# Patient Record
Sex: FEMALE | Race: WHITE | Employment: FULL TIME | URBAN - NONMETROPOLITAN AREA
[De-identification: names, ages, dates, MRNs, and addresses within clinical notes are randomized per-mention and may not be internally consistent; named-entity substitution may affect disease eponyms.]

---

## 2020-09-16 ENCOUNTER — HOSPITAL ENCOUNTER (OUTPATIENT)
Dept: NEUROLOGY | Age: 30
Discharge: HOME | End: 2020-09-16
Payer: COMMERCIAL

## 2020-09-16 PROCEDURE — 95886 MUSC TEST DONE W/N TEST COMP: CPT | Performed by: PSYCHIATRY & NEUROLOGY

## 2020-09-16 PROCEDURE — 95909 NRV CNDJ TST 5-6 STUDIES: CPT | Performed by: PSYCHIATRY & NEUROLOGY

## 2020-09-16 PROCEDURE — 95886 MUSC TEST DONE W/N TEST COMP: CPT

## 2020-09-16 PROCEDURE — 95909 NRV CNDJ TST 5-6 STUDIES: CPT

## 2023-11-01 ENCOUNTER — INITIAL PRENATAL (OUTPATIENT)
Dept: OBSTETRICS AND GYNECOLOGY | Facility: CLINIC | Age: 33
End: 2023-11-01
Payer: COMMERCIAL

## 2023-11-01 VITALS
WEIGHT: 189 LBS | SYSTOLIC BLOOD PRESSURE: 106 MMHG | HEIGHT: 65 IN | BODY MASS INDEX: 31.49 KG/M2 | DIASTOLIC BLOOD PRESSURE: 70 MMHG

## 2023-11-01 DIAGNOSIS — O99.340 DEPRESSION AFFECTING PREGNANCY, ANTEPARTUM: ICD-10-CM

## 2023-11-01 DIAGNOSIS — Z71.85 IMMUNIZATION COUNSELING: ICD-10-CM

## 2023-11-01 DIAGNOSIS — F32.A DEPRESSION AFFECTING PREGNANCY, ANTEPARTUM: ICD-10-CM

## 2023-11-01 DIAGNOSIS — O21.0 MORNING SICKNESS: ICD-10-CM

## 2023-11-01 DIAGNOSIS — Z36.3 SCREENING, ANTENATAL, FOR MALFORMATION BY ULTRASOUND: ICD-10-CM

## 2023-11-01 DIAGNOSIS — O36.80X0 ENCOUNTER TO DETERMINE FETAL VIABILITY OF PREGNANCY, SINGLE OR UNSPECIFIED FETUS: Primary | ICD-10-CM

## 2023-11-01 DIAGNOSIS — Z3A.09 9 WEEKS GESTATION OF PREGNANCY: ICD-10-CM

## 2023-11-01 DIAGNOSIS — Z34.81 MULTIGRAVIDA IN FIRST TRIMESTER: ICD-10-CM

## 2023-11-01 PROBLEM — Z34.90 PREGNANCY: Status: ACTIVE | Noted: 2023-11-01

## 2023-11-01 PROCEDURE — 0501F PRENATAL FLOW SHEET: CPT | Performed by: ADVANCED PRACTICE MIDWIFE

## 2023-11-01 NOTE — PROGRESS NOTES
33-year old patient arrived to initiate prenatal care.     HPI: 33-year old . Patient's last menstrual period was 2023 (approximate). This was not a planned pregnancy, but it was not prevented. She is happy. Pre-pregnancy weight of 182 pounds.    Previous prenatal history significant for: TSVD x 1  History significant for: obesity, former smoker, depression, Bactrim allergy    The following portion of the patient's history were reviewed and updated as needed: allergies, current medications, past family history, past medical history, social history, surgical history, and problem list.    ROS: All systems reviewed and are negative with exception of the following: mood changes (increased depression), amenorrhea, breast tenderness, fatigue, nausea, vomiting      US ordered today, reviewed and shows IUP of 9w6d gestation and Estimated Date of Delivery: 24  Last Pap Smear: patient cannot recall and deferred today with patient request.    Exam:  Wt: 189 lb for TWG of 3.175 kg (7 lb), B/P 106/70, FHTs 162   General Appearance:  healthy-appearing .  HEENT:  NCAT, EOMI, neck supple, no thyroidmegaly.  HR str and reg. No murmur. Lungs clear. Resp even and unlabored.  Abd: Soft, nontender. Bowel sounds active. Uterus is consistent with EGA.  Ext: NT, nontender. No cyanosis or edema.    Diagnoses and all orders for this visit:    1. Encounter to determine fetal viability of pregnancy, single or unspecified fetus (Primary)  - Ultrasound today and reviewed: Viable butts intrauterine gestation. Crown-rump length measures 29.3 mm for estimated gestational age of 9 weeks, 5 days. This is within 6 days of the gestational age by approximate LMP.     2. 9 weeks gestation of pregnancy  - Discussed and ordered initial prenatal labs today:  -     ToxASSURE Select 13 (MW) - Urine, Clean Catch  -     ABO / Rh  -     Antibody Screen  -     CBC & Differential  -     Chlamydia trachomatis, Neisseria gonorrhoeae,  Trichomonas vaginalis, PCR - Urine, Urine, Random Void  -     Hepatitis B Surface Antigen  -     HIV-1 / O / 2 Ag / Antibody  -     RPR, Rfx Qn RPR / Confirm TP  -     Rubella Antibody, IgG  -     Urine Culture - Urine, Urine, Random Void  -     Varicella Zoster Antibody, IgG  -     HCV Antibody Rfx To Qnt PCR  -     Ambulatory Referral to M/Perinatology    3. Multigravida in first trimester  - Reviewed information in new OB packet, including OTC medications for use during pregnancy, first timester of pregnancy and discomforts, regular OB routine, ffDNA and maternal carrier screening testing.  First Trimester of Pregnancy video included in AVS.  - Advised to maintain regular activity.  - Reviewed and encouraged pt to report vaginal bleeding, pelvic pain or cramping, any prolonged illness or infection, or any other concerns.  - RTO in 4 weeks with Dr. Naqvi and as needed with concerns    4. Depression affecting pregnancy, antepartum  - Discussed with patient measures of support, including healthy support system, non-pharmacologic measures, counseling, and medication. Discussed some mental health meads may be taken in pregnancy after weight risk-benefit. Patient to notify provider if symptoms persist or worsen.     5. Morning sickness  - Discussed nonpharmacologic nausea relief measures, including small frequent meals, dry crackers upon rising, carbonated beverages, food/drink containing ginger (ginger ale, tea, gum), avoidance of triggers such as smells, accupressure wrist bands, rest, pregnancy pops, sour candies, aromatherapy. Morning Sickness education included in the AVS.  - Encouraged adequate hydration. Discussed signs of dehydration.  - Counseled on OTC pharmacologic tx with Vitamin B6 and Doxylamine. OTC instructions and Doxylamine; Vitamin B6 Delayed- and Extended-Release Tablets education included in the AVS.  - Call if symptoms fail to improve or worsen.    6. Screening, , for malformation by  ultrasound  - Discussed plan of referral to perinatology at approximately 20-weeks gestation for anatomy scan. Anatomy scan to assess for possible anomalies or markers for aneuploidy.   -     Ambulatory Referral to MFM/Perinatology    7. Immunization counseling  - Discussed influenza vaccine recommendations during pregnancy. Patient declines to receive the influenza immunization today in the clinic. Influenza (Flu) Vaccine (Inactivated or Recombinant): What You Need to Know (VIS) education included in the AVS.        This note has been signed electronically.     Ela Burton, DNP, APRN, CNM, RNC-OB

## 2023-11-05 LAB
ABO GROUP BLD: NORMAL
BACTERIA UR CULT: NORMAL
BACTERIA UR CULT: NORMAL
BASOPHILS # BLD AUTO: 0.04 10*3/MM3 (ref 0–0.2)
BASOPHILS NFR BLD AUTO: 0.4 % (ref 0–1.5)
BLD GP AB SCN SERPL QL: NEGATIVE
C TRACH RRNA SPEC QL NAA+PROBE: NEGATIVE
EOSINOPHIL # BLD AUTO: 0.18 10*3/MM3 (ref 0–0.4)
EOSINOPHIL NFR BLD AUTO: 1.8 % (ref 0.3–6.2)
ERYTHROCYTE [DISTWIDTH] IN BLOOD BY AUTOMATED COUNT: 13.7 % (ref 12.3–15.4)
HBV SURFACE AG SERPL QL IA: NEGATIVE
HCT VFR BLD AUTO: 40.9 % (ref 34–46.6)
HCV AB SERPL QL IA: NORMAL
HCV IGG SERPL QL IA: NON REACTIVE
HGB BLD-MCNC: 13.4 G/DL (ref 12–15.9)
HIV 1+2 AB+HIV1 P24 AG SERPL QL IA: NON REACTIVE
IMM GRANULOCYTES # BLD AUTO: 0.07 10*3/MM3 (ref 0–0.05)
IMM GRANULOCYTES NFR BLD AUTO: 0.7 % (ref 0–0.5)
LYMPHOCYTES # BLD AUTO: 2.72 10*3/MM3 (ref 0.7–3.1)
LYMPHOCYTES NFR BLD AUTO: 28 % (ref 19.6–45.3)
MCH RBC QN AUTO: 27.2 PG (ref 26.6–33)
MCHC RBC AUTO-ENTMCNC: 32.8 G/DL (ref 31.5–35.7)
MCV RBC AUTO: 83.1 FL (ref 79–97)
MONOCYTES # BLD AUTO: 0.58 10*3/MM3 (ref 0.1–0.9)
MONOCYTES NFR BLD AUTO: 6 % (ref 5–12)
N GONORRHOEA RRNA SPEC QL NAA+PROBE: NEGATIVE
NEUTROPHILS # BLD AUTO: 6.14 10*3/MM3 (ref 1.7–7)
NEUTROPHILS NFR BLD AUTO: 63.1 % (ref 42.7–76)
NRBC BLD AUTO-RTO: 0 /100 WBC (ref 0–0.2)
PLATELET # BLD AUTO: 427 10*3/MM3 (ref 140–450)
RBC # BLD AUTO: 4.92 10*6/MM3 (ref 3.77–5.28)
RH BLD: NEGATIVE
RPR SER QL: NON REACTIVE
RUBV IGG SERPL IA-ACNC: 25.4 INDEX
T VAGINALIS RRNA SPEC QL NAA+PROBE: POSITIVE
VZV IGG SER IA-ACNC: 2632 INDEX
WBC # BLD AUTO: 9.73 10*3/MM3 (ref 3.4–10.8)

## 2023-11-06 DIAGNOSIS — O98.311: Primary | ICD-10-CM

## 2023-11-06 LAB — DRUGS UR: NORMAL

## 2023-11-06 RX ORDER — METRONIDAZOLE 500 MG/1
500 TABLET ORAL 2 TIMES DAILY
Qty: 14 TABLET | Refills: 0 | Status: SHIPPED | OUTPATIENT
Start: 2023-11-06 | End: 2023-11-13

## 2023-11-06 NOTE — TELEPHONE ENCOUNTER
Pt called back to discuss results of her prenatal labs. Pt's urine was positive for Trichomonas and Flagyl has not yet been sent to her pharmacy. Med pending if appropriate

## 2023-11-13 PROBLEM — Z64.1 MULTIGRAVIDA: Status: ACTIVE | Noted: 2023-11-13

## 2023-11-29 ENCOUNTER — ROUTINE PRENATAL (OUTPATIENT)
Dept: OBSTETRICS AND GYNECOLOGY | Facility: CLINIC | Age: 33
End: 2023-11-29
Payer: COMMERCIAL

## 2023-11-29 VITALS — SYSTOLIC BLOOD PRESSURE: 116 MMHG | WEIGHT: 190 LBS | BODY MASS INDEX: 31.62 KG/M2 | DIASTOLIC BLOOD PRESSURE: 78 MMHG

## 2023-11-29 DIAGNOSIS — Z34.81 SUPERVISION OF NORMAL INTRAUTERINE PREGNANCY IN MULTIGRAVIDA, FIRST TRIMESTER: Primary | ICD-10-CM

## 2023-11-29 NOTE — PROGRESS NOTES
No complaints. Keeping down po. Declines NIPS/carrier screening. Denies pain, VB, LOF.  Reports small episodes of FM.     /78   Wt 86.2 kg (190 lb)   LMP 08/30/2023 (Approximate)   BMI 31.62 kg/m²    FHTs 143  Abdomen: soft/NT/ND normal BS     Diagnoses and all orders for this visit:    1. Supervision of normal intrauterine pregnancy in multigravida, first trimester (Primary)  IUP at 13 6/7 weeks gestation for return OB visit. Pt doing well. Declines NIPS/carrier screening. Declines flu shot. Sneak peak US for gender next visit. RTC 4 weeks.

## 2023-12-27 ENCOUNTER — ROUTINE PRENATAL (OUTPATIENT)
Dept: OBSTETRICS AND GYNECOLOGY | Facility: CLINIC | Age: 33
End: 2023-12-27
Payer: COMMERCIAL

## 2023-12-27 VITALS — SYSTOLIC BLOOD PRESSURE: 116 MMHG | DIASTOLIC BLOOD PRESSURE: 78 MMHG | BODY MASS INDEX: 30.45 KG/M2 | WEIGHT: 183 LBS

## 2023-12-27 DIAGNOSIS — Z3A.17 17 WEEKS GESTATION OF PREGNANCY: Primary | ICD-10-CM

## 2023-12-27 DIAGNOSIS — Z28.21 INFLUENZA VACCINATION DECLINED: ICD-10-CM

## 2023-12-27 DIAGNOSIS — Z34.82 MULTIGRAVIDA IN SECOND TRIMESTER: ICD-10-CM

## 2023-12-27 LAB
GLUCOSE UR STRIP-MCNC: NEGATIVE MG/DL
PROT UR STRIP-MCNC: NEGATIVE MG/DL

## 2023-12-27 PROCEDURE — 0502F SUBSEQUENT PRENATAL CARE: CPT | Performed by: ADVANCED PRACTICE MIDWIFE

## 2023-12-27 RX ORDER — PRENATAL VIT NO.126/IRON/FOLIC 28MG-0.8MG
TABLET ORAL DAILY
COMMUNITY

## 2023-12-27 NOTE — PROGRESS NOTES
Reason for visit: Routine OB visit at 17w6d. KIM 2024, by Ultrasound    CC:  She is feeling okay today. She does report she has experienced some pains by the belly button and wants to make sure it is not concerning. Denies VB, LOF, pelvic pain, or cramping.     ROS: All systems reviewed and are negative with exception of the following: amenorrhea    Wt 183 lb for a TWG of 0.454 kg (1 lb), /78, FHTs 152  Urine today and reviewed: negative glucose, negative protein    Anatomy Scan: scheduled for 2024 at 1230    Exam:  General Appearance:  Healthy appearing . Normal mood and behavior.  HEENT: NCAT, EOMI  HR str and reg. Lungs clear. Resp even and unlabored.  Abdomen is soft and nontender. No CVA tenderness. Uterus is consistent with EGA.  Ext: no edema, nontender, no trauma, or cyanosis.    Impression  Diagnoses and all orders for this visit:    1. 17 weeks gestation of pregnancy (Primary)  -     POC Urinalysis Dipstick    2. Multigravida in second trimester  Discussed second trimester of pregnancy, discomforts and measures of support, fetal movement, pelvic pain warnings, bleeding warnings, and signs and symptoms to report. Discussed and encouraged to call or come to the hospital with vaginal bleeding, leaking of fluid, pelvic pain, or other concerns. Second Trimester of Pregnancy video and Round Ligament Pain education included in the AVS.    3. Influenza vaccination declined  Reviewed influenza immunization recommendations during pregnancy. Discussed immune health and self-care measures. Encouraged screening and testing if she would experience influenza-like illness symptoms for possible treatment with Tamiflu if indicated.           Return to the office in 4 weeks for a routine prenatal visit with Dr. Calabrese as patient would like to meet additional providers and as needed with concerns.        This note has been signed electronically.    Ela Burton, DNP, APRN, CNM, RNC-OB

## 2024-01-30 ENCOUNTER — ROUTINE PRENATAL (OUTPATIENT)
Dept: OBSTETRICS AND GYNECOLOGY | Age: 34
End: 2024-01-30
Payer: COMMERCIAL

## 2024-01-30 VITALS — SYSTOLIC BLOOD PRESSURE: 118 MMHG | BODY MASS INDEX: 30.95 KG/M2 | WEIGHT: 186 LBS | DIASTOLIC BLOOD PRESSURE: 82 MMHG

## 2024-01-30 DIAGNOSIS — Z3A.22 22 WEEKS GESTATION OF PREGNANCY: Primary | ICD-10-CM

## 2024-01-30 LAB
GLUCOSE UR STRIP-MCNC: NEGATIVE MG/DL
PROT UR STRIP-MCNC: ABNORMAL MG/DL

## 2024-01-30 PROCEDURE — 0502F SUBSEQUENT PRENATAL CARE: CPT | Performed by: OBSTETRICS & GYNECOLOGY

## 2024-01-30 NOTE — PROGRESS NOTES
Tired, but no other complaints.  No LOF or VB  Good FM  MFM u/s with IEF in L ventricle, will look at it again with growth u/s at 32 weeks  Patient reports she and partner were both treated for trichomonas  Weight gain appropriate  GCT at next visit.  Instructions given

## 2024-02-21 ENCOUNTER — ROUTINE PRENATAL (OUTPATIENT)
Dept: OBSTETRICS AND GYNECOLOGY | Age: 34
End: 2024-02-21
Payer: COMMERCIAL

## 2024-02-21 VITALS — SYSTOLIC BLOOD PRESSURE: 114 MMHG | WEIGHT: 189 LBS | DIASTOLIC BLOOD PRESSURE: 76 MMHG | BODY MASS INDEX: 31.45 KG/M2

## 2024-02-21 DIAGNOSIS — A59.01 TRICHOMONAL VAGINITIS DURING PREGNANCY, ANTEPARTUM: ICD-10-CM

## 2024-02-21 DIAGNOSIS — Z30.8 ENCOUNTER FOR TUBAL LIGATION COUNSELING: ICD-10-CM

## 2024-02-21 DIAGNOSIS — Z34.82 MULTIGRAVIDA IN SECOND TRIMESTER: ICD-10-CM

## 2024-02-21 DIAGNOSIS — O23.599 TRICHOMONAL VAGINITIS DURING PREGNANCY, ANTEPARTUM: ICD-10-CM

## 2024-02-21 DIAGNOSIS — Z3A.25 25 WEEKS GESTATION OF PREGNANCY: Primary | ICD-10-CM

## 2024-02-21 DIAGNOSIS — Z28.21 INFLUENZA VACCINATION DECLINED: ICD-10-CM

## 2024-02-21 DIAGNOSIS — Z71.85 IMMUNIZATION COUNSELING: ICD-10-CM

## 2024-02-21 PROBLEM — O35.BXX0 FETAL CARDIAC ECHOGENIC FOCUS, ANTEPARTUM: Status: ACTIVE | Noted: 2024-01-09

## 2024-02-21 LAB
GLUCOSE UR STRIP-MCNC: NEGATIVE MG/DL
PROT UR STRIP-MCNC: ABNORMAL MG/DL

## 2024-02-21 NOTE — PROGRESS NOTES
Reason for visit: Routine OB visit at 25w6d     CC:  Endorses good fetal movement. Denies VB, LOF, contractions, h/a, visual changes, and right upper quadrant pain.     ROS: All systems reviewed and are negative.    Wt 189 lb for a TWG of 3.175 kg (7 lb), /76, FHTs 141, FH 26 cm  Urine today and reviewed: negative glucose, trace protein    19-week Anatomy scan: EFW 50%; EIF; anterior placenta without evidence of placenta previa    Exam:  General Appearance:  No visualized signs of distress. Normal mood and behavior  Cardiorespiratory: HR str and reg. Lungs clear. Resp even and unlabored.  Abdomen: is soft and nontender. No CVA tenderness. Uterus is   Ext: No edema. Calves non-tender.     Impression  Diagnoses and all orders for this visit:    1. 25 weeks gestation of pregnancy (Primary)  -     POC Urinalysis Dipstick  -     Chlamydia trachomatis, Neisseria gonorrhoeae, Trichomonas vaginalis, PCR - Urine, Urine, Random Void    2. Multigravida in second trimester  Discussed second trimester of pregnancy, discomforts and measures of support, fetal movement,  labor warnings, preeclampsia warnings, and signs and symptoms to report. Also discussed with patient plan for hemoglobin and glucose tolerance testing. Instructions on glucose tolerance test and dietary intake prior to the test provided. Patient to notify provider or come to the hospital with vaginal bleeding, leaking of fluid, contractions, or other concerns. Second Trimester of Pregnancy video and Round Ligament Pain education included in the AVS.    3. Trichomonal vaginitis during pregnancy, antepartum  Previous positive test. Will send urine for test of cure.   -     Chlamydia trachomatis, Neisseria gonorrhoeae, Trichomonas vaginalis, PCR - Urine, Urine, Random Void    4. Encounter for tubal ligation counseling  Discussed the procedure as being a salpingectomy and importance with taking time with decision making as this is deemed a non-reversible  method of contraception. Discussed risks (infection, pain, bleeding, damage to organs or tissues), benefits (minimized risk of incidental/ectopic pregnancy, reduced risks of cancers like fallopian tube or ovarian), completed as either an outpatient procedure after a vaginal delivery or possibly during a non-emergent  delivery. Questions answered to patient satisfaction. Consents to be signed.     5. Immunization counseling  Reviewed Tdap immunization recommendations during pregnancy. Patient to consider receiving the Tdap immunization during her next prenatal visit. Tdap (Tetanus, Diptheria, Pertussis) Vaccine: What You Need to Know (VIS) education included in the AVS.    6. Influenza vaccination declined  Patient declines to receive the influenza immunization this prenatal visit. Reviewed immune health practices and self-care measures to promote well-being. Encouraged screening and testing if patient experiences influenza-like illness symptoms for treatment with Tamiflu if indicated.           Return to the office in 3 weeks for routine prenatal visit with Dr Calabrese with GTT and as needed with concerns.        This note has been signed electronically.    Ela Burton, DNP, APRN, CNM, RNC-OB

## 2024-02-24 LAB
C TRACH RRNA SPEC QL NAA+PROBE: NEGATIVE
N GONORRHOEA RRNA SPEC QL NAA+PROBE: NEGATIVE
T VAGINALIS RRNA SPEC QL NAA+PROBE: NEGATIVE

## 2024-03-13 ENCOUNTER — ROUTINE PRENATAL (OUTPATIENT)
Dept: OBSTETRICS AND GYNECOLOGY | Age: 34
End: 2024-03-13
Payer: COMMERCIAL

## 2024-03-13 VITALS — SYSTOLIC BLOOD PRESSURE: 110 MMHG | DIASTOLIC BLOOD PRESSURE: 78 MMHG | WEIGHT: 188 LBS | BODY MASS INDEX: 31.28 KG/M2

## 2024-03-13 DIAGNOSIS — Z11.3 ROUTINE SCREENING FOR STI (SEXUALLY TRANSMITTED INFECTION): ICD-10-CM

## 2024-03-13 DIAGNOSIS — Z13.1 SPECIAL SCREENING EXAMINATION FOR DIABETES MELLITUS: ICD-10-CM

## 2024-03-13 DIAGNOSIS — Z67.91 RH NEGATIVE, ANTEPARTUM: ICD-10-CM

## 2024-03-13 DIAGNOSIS — Z71.89 ENCOUNTER FOR ANTEPARTUM CONSULTATION REGARDING LACTATION: ICD-10-CM

## 2024-03-13 DIAGNOSIS — Z71.85 IMMUNIZATION COUNSELING: ICD-10-CM

## 2024-03-13 DIAGNOSIS — Z34.83 MULTIGRAVIDA IN THIRD TRIMESTER: ICD-10-CM

## 2024-03-13 DIAGNOSIS — Z3A.28 28 WEEKS GESTATION OF PREGNANCY: Primary | ICD-10-CM

## 2024-03-13 DIAGNOSIS — Z13.0 SCREENING FOR DEFICIENCY ANEMIA: ICD-10-CM

## 2024-03-13 DIAGNOSIS — O26.899 RH NEGATIVE, ANTEPARTUM: ICD-10-CM

## 2024-03-13 LAB
GLUCOSE UR STRIP-MCNC: NEGATIVE MG/DL
PROT UR STRIP-MCNC: ABNORMAL MG/DL

## 2024-03-13 RX ORDER — BREAST PUMP
1 EACH MISCELLANEOUS AS NEEDED
Qty: 1 EACH | Refills: 0 | Status: SHIPPED | OUTPATIENT
Start: 2024-03-13

## 2024-03-13 NOTE — PROGRESS NOTES
"Reason for visit: Routine OB visit at 28w6d     CC:  She did not complete her glucose tolerance test today. As delivery approaches or the thought of delivery, patient becomes more anxious secondary to \"horrific\" birth experience with her other child. Endorses good fetal movement. Denies VB, LOF, contractions, h/a, visual changes, and right upper quadrant pain.     ROS: All systems reviewed and are negative with exception of the following: anxious, increased stress    Wt 188 lb for a TWG of 2.722 kg (6 lb), /78, FHTs 146, FH 28 cm  Urine today and reviewed: negative glucose, 1+ protein    19-week Anatomy scan: EFW 50%; EIF; anterior placenta without evidence of placenta previa     Exam:  General Appearance:  No visualized signs of distress. Normal mood and behavior  Cardiorespiratory: HR str and reg. Lungs clear. Resp even and unlabored.  Abdomen: is soft and nontender. No CVA tenderness. Uterus is consistent with estimated gestational age.  Ext: No edema. Calves non-tender.     Impression  Diagnoses and all orders for this visit:    1. 28 weeks gestation of pregnancy (Primary)  Discussed practicing measures of relaxation for during the birthing experience. Discussed measures for mood, including anxiety.   -     POC Urinalysis Dipstick  -     Rhogam Immune Globulin Immunization  -     Antibody Screen  -     Hemoglobin  -     RPR, Rfx Qn RPR / Confirm TP  -     Gestational Screen 1 Hr ([facility])  -     Misc. Devices (Breast Pump) misc; Use 1 Device As Needed (for breastfeeding).  Dispense: 1 each; Refill: 0    2. Multigravida in third trimester  Discussed third trimester of pregnancy, including discomforts and measures of support,  labor warnings, preeclampsia warnings, and signs and symptoms to report. Discussed glucose and hemoglobin screenings today in the clinic. Labs ordered today. Patient to notify provider and/or come to the hospital for vaginal bleeding, leaking of fluid, contractions, or other " concerns. Third Trimester of Pregnancy video included in the AVS.    3. Rh negative, antepartum  Discussed the Rhogam injection due to patient's Rh negative status and patient to receive the injection after the antibody screen has been drawn. Rho [D] Immune Globulin Injection education included in the AVS.  -     Rhogam Immune Globulin Immunization  -     Antibody Screen    4. Screening for deficiency anemia  -     Hemoglobin    5. Routine screening for STI (sexually transmitted infection)  -     RPR, Rfx Qn RPR / Confirm TP    6. Immunization counseling  Reviewed Tdap immunization recommendations during pregnancy. She will consider the Tdap immunization for an upcoming prenatal visit. Tdap (Tetanus, Diptheria, Pertussis) Vaccine: What You Need to Know (VIS) education included in the AVS.    7. Special screening examination for diabetes mellitus  Reviewed diet recommendations with screening and patient will return one afternoon to complete her glucose tolerance test.   -     Gestational Screen 1 Hr ([facility])    8. Encounter for antepartum consultation regarding lactation  Discussed breastfeeding benefits, lactation support and education through the hospital's Maternal/Child Department's community education program, [employer] lactation support group, and breast pumps. Encouraged watching the breastfeeding videos listed on a handout received in the initial prenatal information. Breast pump order sent to pharmacy.  -     Misc. Devices (Breast Pump) misc; Use 1 Device As Needed (for breastfeeding).  Dispense: 1 each; Refill: 0          Return to the office in 2 weeks for routine prenatal visit with Dr. Calabrese and as needed with concerns.        This note has been signed electronically.    Ela Burton, DNP, APRN, CNM, RNC-OB

## 2024-03-14 LAB
BLD GP AB SCN SERPL QL: NEGATIVE
HGB BLD-MCNC: 10.4 G/DL (ref 11.1–15.9)
RPR SER QL: NON REACTIVE

## 2024-03-20 DIAGNOSIS — O99.019 MATERNAL ANEMIA IN PREGNANCY, ANTEPARTUM: Primary | ICD-10-CM

## 2024-03-20 RX ORDER — FERROUS SULFATE 324(65)MG
324 TABLET, DELAYED RELEASE (ENTERIC COATED) ORAL EVERY OTHER DAY
Qty: 30 TABLET | Refills: 3 | Status: SHIPPED | OUTPATIENT
Start: 2024-03-20

## 2024-03-26 ENCOUNTER — ROUTINE PRENATAL (OUTPATIENT)
Dept: OBSTETRICS AND GYNECOLOGY | Age: 34
End: 2024-03-26
Payer: COMMERCIAL

## 2024-03-26 VITALS — WEIGHT: 187 LBS | DIASTOLIC BLOOD PRESSURE: 80 MMHG | BODY MASS INDEX: 31.12 KG/M2 | SYSTOLIC BLOOD PRESSURE: 108 MMHG

## 2024-03-26 DIAGNOSIS — Z3A.30 30 WEEKS GESTATION OF PREGNANCY: Primary | ICD-10-CM

## 2024-03-26 DIAGNOSIS — G44.89 OTHER HEADACHE SYNDROME: ICD-10-CM

## 2024-03-26 LAB
CLARITY, POC: ABNORMAL
COLOR UR: ABNORMAL
GLUCOSE UR STRIP-MCNC: NEGATIVE MG/DL
PROT UR STRIP-MCNC: NEGATIVE MG/DL

## 2024-03-26 PROCEDURE — 0502F SUBSEQUENT PRENATAL CARE: CPT | Performed by: NURSE PRACTITIONER

## 2024-03-26 RX ORDER — AZITHROMYCIN 250 MG/1
TABLET, FILM COATED ORAL
COMMUNITY
Start: 2024-03-22

## 2024-03-26 NOTE — PROGRESS NOTES
"Reason for visit: Routine OB visit at 30w5d   Chief Complaint   Patient presents with    Routine Prenatal Visit     Pt reports having the 'worst headache I've ever had' and has been admitted to [facility] 3/19/24-3/20/24.  PCP put on antibiotics 3/23/24, reports left eye swelling and pressure. PCP put on Azithromycin.  Reports headache is still there but more manageable.  Couldn't come up with diagnosis so PCP treated with antibiotics.  Pt reports appropriate fetal movement and denies pelvic pain, vaginal bleeding or discharge.       CC:  headache. Endorses normal fetal movement. Denies VB, LOF, contractions, h/a, visual changes, and right upper quadrant pain. Left sided ear pain, watering of left eye and severe headache    ROS: All systems reviewed and are negative with exception of the following: \"worse headache of her lift\"    Wt 188 lb for a TWG of 2.268 kg (5 lb), /80, FHTs 139  Urine today and reviewed: neg glucose, neg protein        Exam:  General Appearance:  No visualized signs of distress. Normal mood and behavior  Cardiorespiratory: HR str and reg. Lungs clear. Resp even and unlabored.  Abdomen: is soft and nontender. no CVA tenderness. Uterus is normal for gestational age  Ext: no edema. Calves non-tender.     Impression  Diagnoses and all orders for this visit:    1. 30 weeks gestation of pregnancy (Primary)  -     POC Urinalysis Dipstick    2. Other headache syndrome  Comments:  discussed using benadryl or zyrtec/claritin   tylenol PRN        Head CT or MRI if headache not improving       Return to the office in 2 weeks for routine prenatal and growth US and as needed with concerns.    This note has been signed electronically.    Patricia RICH  "

## 2024-04-04 ENCOUNTER — ROUTINE PRENATAL (OUTPATIENT)
Age: 34
End: 2024-04-04
Payer: COMMERCIAL

## 2024-04-04 VITALS — DIASTOLIC BLOOD PRESSURE: 64 MMHG | SYSTOLIC BLOOD PRESSURE: 112 MMHG | WEIGHT: 187 LBS | BODY MASS INDEX: 31.12 KG/M2

## 2024-04-04 DIAGNOSIS — K21.9 GASTROESOPHAGEAL REFLUX DURING PREGNANCY IN THIRD TRIMESTER, ANTEPARTUM: ICD-10-CM

## 2024-04-04 DIAGNOSIS — Z67.91 RH NEGATIVE, ANTEPARTUM: ICD-10-CM

## 2024-04-04 DIAGNOSIS — O26.899 RH NEGATIVE, ANTEPARTUM: ICD-10-CM

## 2024-04-04 DIAGNOSIS — Z3A.32 32 WEEKS GESTATION OF PREGNANCY: Primary | ICD-10-CM

## 2024-04-04 DIAGNOSIS — O99.019 MATERNAL ANEMIA IN PREGNANCY, ANTEPARTUM: ICD-10-CM

## 2024-04-04 DIAGNOSIS — Z23 NEED FOR TDAP VACCINATION: ICD-10-CM

## 2024-04-04 DIAGNOSIS — O99.613 GASTROESOPHAGEAL REFLUX DURING PREGNANCY IN THIRD TRIMESTER, ANTEPARTUM: ICD-10-CM

## 2024-04-04 DIAGNOSIS — Z34.83 MULTIGRAVIDA IN THIRD TRIMESTER: ICD-10-CM

## 2024-04-04 LAB
GLUCOSE UR STRIP-MCNC: NEGATIVE MG/DL
PROT UR STRIP-MCNC: NEGATIVE MG/DL

## 2024-04-04 PROCEDURE — 0502F SUBSEQUENT PRENATAL CARE: CPT | Performed by: ADVANCED PRACTICE MIDWIFE

## 2024-04-04 PROCEDURE — 90715 TDAP VACCINE 7 YRS/> IM: CPT | Performed by: ADVANCED PRACTICE MIDWIFE

## 2024-04-04 PROCEDURE — 90471 IMMUNIZATION ADMIN: CPT | Performed by: ADVANCED PRACTICE MIDWIFE

## 2024-04-04 RX ORDER — FAMOTIDINE 20 MG/1
20 TABLET, FILM COATED ORAL DAILY
Qty: 90 TABLET | Refills: 1 | Status: SHIPPED | OUTPATIENT
Start: 2024-04-04 | End: 2025-04-04

## 2024-04-04 NOTE — PROGRESS NOTES
Reason for visit: Routine OB visit at 32w0d     CC:  She is overall feeling well today. Endorses good fetal movement. Denies VB, LOF, contractions, h/a, visual changes, and right upper quadrant pain.     ROS: All systems reviewed and are negative with exception of the following: amenorrhea, reflux/heartburn    Wt 84.8 kg (187 lb) lb for a TWG of 2.268 kg (5 lb), /64, FHTs 144, FH 31 cm  Urine today and reviewed: negative glucose, negative protein      32-week U/S today: EFW 56.4%, 1994 g; AC 61%; APRIL 19.91 cm, DVP 6.10 cm; normal interval growth; vertex presentation; anterior placenta      Exam:  General Appearance:  No visualized signs of distress. Normal mood and behavior  Cardiorespiratory: HR str and reg. Lungs clear. Resp even and unlabored.  Abdomen: is soft and nontender. No CVA tenderness. Uterus is consistent with estimated gestational age..  Ext: No edema. Calves non-tender.     Impression  Diagnoses and all orders for this visit:    1. 32 weeks gestation of pregnancy (Primary)  Planning for  as pediatrician in the hospital and then LAYLA Rondon as the provider in the office.   -     POC Urinalysis Dipstick  -     famotidine (Pepcid) 20 MG tablet; Take 1 tablet by mouth Daily.  Dispense: 90 tablet; Refill: 1  -     Tdap Vaccine => 6yo IM (BOOSTRIX)    2. Multigravida in third trimester  Discussed third trimester of pregnancy, discomforts and measures of support, fetal movement counts,  labor warnings, preeclampsia warnings, and signs and symptoms to report. Discussed and encouraged to come to the hospital or call with vaginal bleeding, leaking of fluid, contractions, or other concerns. Third Trimester of Pregnancy, Signs and Symptoms of Labor, and Alternative Pain Management During Labor and Delivery videos included in the AVS.    3. Rh negative, antepartum  Received rhogam 3/13/2024.     4. Maternal anemia in pregnancy, antepartum  Taking slow release iron. Discussed sources of iron  in her diet.     5. Gastroesophageal reflux during pregnancy in third trimester, antepartum  Discussed measures of support, including reflux precautions, OTC medications such and Tums and also Pepcid. Prescription discussed and sent to pharmacy.  -     famotidine (Pepcid) 20 MG tablet; Take 1 tablet by mouth Daily.  Dispense: 90 tablet; Refill: 1    6. Need for Tdap vaccination  Reviewed Tdap immunization recommendations during pregnancy. Consents to receive the Tdap immunization during this prenatal visit. Tdap (Tetanus, Diptheria, Pertussis) Vaccine: What You Need to Know (VIS) education included in the AVS.  -     Tdap Vaccine => 6yo IM (BOOSTRIX)        Return to the office in 2 weeks for routine prenatal visit with Dr. Calabrese and as needed with concerns.        This note has been signed electronically.    Ela Burton, DNP, APRN, CNM, RNC-OB

## 2024-04-30 ENCOUNTER — ROUTINE PRENATAL (OUTPATIENT)
Dept: OBSTETRICS AND GYNECOLOGY | Age: 34
End: 2024-04-30
Payer: COMMERCIAL

## 2024-04-30 VITALS — DIASTOLIC BLOOD PRESSURE: 78 MMHG | BODY MASS INDEX: 31.12 KG/M2 | WEIGHT: 187 LBS | SYSTOLIC BLOOD PRESSURE: 118 MMHG

## 2024-04-30 DIAGNOSIS — Z3A.35 35 WEEKS GESTATION OF PREGNANCY: Primary | ICD-10-CM

## 2024-04-30 LAB
GLUCOSE UR STRIP-MCNC: NEGATIVE MG/DL
PROT UR STRIP-MCNC: NEGATIVE MG/DL

## 2024-04-30 PROCEDURE — 0502F SUBSEQUENT PRENATAL CARE: CPT | Performed by: OBSTETRICS & GYNECOLOGY

## 2024-04-30 NOTE — PROGRESS NOTES
Patient tired, but not hurting.  No LOF or VB  Good FM  Patient desires tubal sterilization, signing consent today just in case she requires a C/S  Patient is very anxious because she says her first labor was a bad experience - sounds like it was prolonged and her cervix did not dilate well while she was on pitocin.  First delivery was in [place].  L&D question answered    Diagnoses and all orders for this visit:    1. 35 weeks gestation of pregnancy (Primary)  -     POC Urinalysis Dipstick

## 2024-05-09 ENCOUNTER — ROUTINE PRENATAL (OUTPATIENT)
Age: 34
End: 2024-05-09
Payer: COMMERCIAL

## 2024-05-09 VITALS — BODY MASS INDEX: 30.79 KG/M2 | WEIGHT: 185 LBS | DIASTOLIC BLOOD PRESSURE: 74 MMHG | SYSTOLIC BLOOD PRESSURE: 108 MMHG

## 2024-05-09 DIAGNOSIS — Z3A.37 37 WEEKS GESTATION OF PREGNANCY: Primary | ICD-10-CM

## 2024-05-09 DIAGNOSIS — Z67.91 RH NEGATIVE, ANTEPARTUM: ICD-10-CM

## 2024-05-09 DIAGNOSIS — Z34.83 MULTIGRAVIDA IN THIRD TRIMESTER: ICD-10-CM

## 2024-05-09 DIAGNOSIS — O26.899 RH NEGATIVE, ANTEPARTUM: ICD-10-CM

## 2024-05-09 LAB
GLUCOSE UR STRIP-MCNC: NEGATIVE MG/DL
PROT UR STRIP-MCNC: NEGATIVE MG/DL

## 2024-05-09 NOTE — PROGRESS NOTES
Reason for visit: Routine OB visit at 37w0d     CC:  She is feeling okay today. Endorses good fetal movement. Denies VB, LOF, contractions, h/a, visual changes, and right upper quadrant pain.     ROS: All systems reviewed and are negative with exception of the following: hip/joint pain, amenorrhea,     Wt 83.9 kg (185 lb) lb for a TWG of 1.361 kg (3 lb), /74, FHTs 155, FH 37 cm  Urine today and reviewed: negative glucose, negative protein    32-week U/S: EFW 56.4%, 1994 g; AC 61%; APRIL 19.91 cm, DVP 6.10 cm; normal interval growth; vertex presentation; anterior placenta     Exam:  General Appearance:  No visualized signs of distress. Normal mood and behavior  Cardiorespiratory: HR str and reg. Lungs clear. Resp even and unlabored.  Abdomen: is soft and nontender. No CVA tenderness. Uterus is consistent with estimated gestational age.  Ext: No edema. Calves non-tender.     Impression  Diagnoses and all orders for this visit:    1. 37 weeks gestation of pregnancy (Primary)  Discussed and encouraged practicing measures of relaxation during the birthing experience. Also discussed measures to support or promote labor at term.   -     POC Urinalysis Dipstick  -     Chlamydia trachomatis, Neisseria gonorrhoeae, PCR w/ confirmation - Swab, Cervix  -     Strep B Screen - , Vaginal/Rectum    2. Multigravida in third trimester  Discussed third trimester discomforts and measures of support, fetal movement counts, signs of labor, preeclampsia warnings, and signs and symptoms to report. Reviewed GBS and STI cultures to be collected today. Discussed and encouraged to come to the hospital or call with vaginal bleeding, leaking of fluid, regular contractions, or other concerns. Signs and Symptoms of Labor and Alternative Pain Management During Labor and Delivery videos included in the AVS.    3. Rh negative, antepartum  Received  Rhogam 3/13/2024.           Return to the office in 1 week for routine prenatal visit with   [place] and as needed with concerns.        This note has been signed electronically.    Ela Burton, DAVID, APRN, CNM, RNC-OB

## 2024-05-13 LAB
C TRACH RRNA SPEC QL NAA+PROBE: NEGATIVE
GP B STREP DNA SPEC QL NAA+PROBE: POSITIVE
N GONORRHOEA RRNA SPEC QL NAA+PROBE: NEGATIVE

## 2024-05-14 ENCOUNTER — ROUTINE PRENATAL (OUTPATIENT)
Dept: OBSTETRICS AND GYNECOLOGY | Age: 34
End: 2024-05-14
Payer: COMMERCIAL

## 2024-05-14 VITALS — SYSTOLIC BLOOD PRESSURE: 110 MMHG | DIASTOLIC BLOOD PRESSURE: 70 MMHG | BODY MASS INDEX: 30.45 KG/M2 | WEIGHT: 183 LBS

## 2024-05-14 DIAGNOSIS — Z3A.37 37 WEEKS GESTATION OF PREGNANCY: ICD-10-CM

## 2024-05-14 DIAGNOSIS — Z67.91 RH NEGATIVE, ANTEPARTUM: ICD-10-CM

## 2024-05-14 DIAGNOSIS — Z34.83 MULTIGRAVIDA IN THIRD TRIMESTER: Primary | ICD-10-CM

## 2024-05-14 DIAGNOSIS — O26.899 RH NEGATIVE, ANTEPARTUM: ICD-10-CM

## 2024-05-14 LAB
GLUCOSE UR STRIP-MCNC: NEGATIVE MG/DL
PROT UR STRIP-MCNC: NEGATIVE MG/DL

## 2024-05-14 PROCEDURE — 0502F SUBSEQUENT PRENATAL CARE: CPT | Performed by: OBSTETRICS & GYNECOLOGY

## 2024-05-14 NOTE — PROGRESS NOTES
Patient feeling a lot of pressure in pelvis and low back.  No contractions that are painful, just some B-H.  No LOF or VB.  Good FM  GBS +.  Cx 1/thick    Diagnoses and all orders for this visit:    1. Multigravida in third trimester (Primary)    2. 37 weeks gestation of pregnancy  -     POC Urinalysis Dipstick    3. Rh negative, antepartum

## 2024-05-21 ENCOUNTER — ROUTINE PRENATAL (OUTPATIENT)
Dept: OBSTETRICS AND GYNECOLOGY | Age: 34
End: 2024-05-21
Payer: COMMERCIAL

## 2024-05-21 VITALS — BODY MASS INDEX: 30.62 KG/M2 | WEIGHT: 184 LBS | DIASTOLIC BLOOD PRESSURE: 68 MMHG | SYSTOLIC BLOOD PRESSURE: 104 MMHG

## 2024-05-21 DIAGNOSIS — Z67.91 RH NEGATIVE, ANTEPARTUM: ICD-10-CM

## 2024-05-21 DIAGNOSIS — O26.899 RH NEGATIVE, ANTEPARTUM: ICD-10-CM

## 2024-05-21 DIAGNOSIS — Z34.83 MULTIGRAVIDA IN THIRD TRIMESTER: Primary | ICD-10-CM

## 2024-05-21 DIAGNOSIS — Z3A.38 38 WEEKS GESTATION OF PREGNANCY: ICD-10-CM

## 2024-05-21 NOTE — PROGRESS NOTES
"Very uncomfortable with growing size.  Patient reports \"tightening\", but is not really having painful contractions.  The only real pain is at symphysis when she gets up from a seated position.  Cx unchanged today, still 1/thick  Patient wants to consider IOL next week    Diagnoses and all orders for this visit:    1. Multigravida in third trimester (Primary)    2. 38 weeks gestation of pregnancy  -     POC Urinalysis Dipstick    3. Rh negative, antepartum       "

## 2024-05-23 RX ORDER — BREAST PUMP
1 EACH MISCELLANEOUS AS NEEDED
Qty: 1 EACH | Refills: 0 | Status: SHIPPED | OUTPATIENT
Start: 2024-05-23

## 2024-05-28 ENCOUNTER — ROUTINE PRENATAL (OUTPATIENT)
Dept: OBSTETRICS AND GYNECOLOGY | Age: 34
End: 2024-05-28
Payer: COMMERCIAL

## 2024-05-28 ENCOUNTER — HOSPITAL ENCOUNTER (INPATIENT)
Facility: HOSPITAL | Age: 34
LOS: 2 days | Discharge: HOME | End: 2024-05-31
Attending: OBSTETRICS & GYNECOLOGY | Admitting: OBSTETRICS & GYNECOLOGY
Payer: COMMERCIAL

## 2024-05-28 VITALS — BODY MASS INDEX: 31.45 KG/M2 | WEIGHT: 189 LBS | SYSTOLIC BLOOD PRESSURE: 122 MMHG | DIASTOLIC BLOOD PRESSURE: 88 MMHG

## 2024-05-28 DIAGNOSIS — Z34.83 MULTIGRAVIDA IN THIRD TRIMESTER: ICD-10-CM

## 2024-05-28 DIAGNOSIS — Z67.91 RH NEGATIVE, ANTEPARTUM: ICD-10-CM

## 2024-05-28 DIAGNOSIS — O26.899 RH NEGATIVE, ANTEPARTUM: ICD-10-CM

## 2024-05-28 DIAGNOSIS — Z3A.39 39 WEEKS GESTATION OF PREGNANCY: Primary | ICD-10-CM

## 2024-05-28 LAB
ABO GROUP BLD: NORMAL
BLD GP AB SCN SERPL QL: NEGATIVE
DEPRECATED RDW RBC AUTO: 44.7 FL (ref 37–54)
ERYTHROCYTE [DISTWIDTH] IN BLOOD BY AUTOMATED COUNT: 15.6 % (ref 12.3–15.4)
GLUCOSE UR STRIP-MCNC: NEGATIVE MG/DL
HCT VFR BLD AUTO: 28.9 % (ref 34–46.6)
HGB BLD-MCNC: 9 G/DL (ref 12–15.9)
MCH RBC QN AUTO: 24.7 PG (ref 26.6–33)
MCHC RBC AUTO-ENTMCNC: 31.1 G/DL (ref 31.5–35.7)
MCV RBC AUTO: 79.4 FL (ref 79–97)
PLATELET # BLD AUTO: 384 10*3/MM3 (ref 140–450)
PMV BLD AUTO: 10.3 FL (ref 6–12)
PROT UR STRIP-MCNC: NEGATIVE MG/DL
RBC # BLD AUTO: 3.64 10*6/MM3 (ref 3.77–5.28)
RH BLD: NEGATIVE
T&S EXPIRATION DATE: NORMAL
WBC NRBC COR # BLD AUTO: 11.92 10*3/MM3 (ref 3.4–10.8)

## 2024-05-28 PROCEDURE — 0502F SUBSEQUENT PRENATAL CARE: CPT | Performed by: OBSTETRICS & GYNECOLOGY

## 2024-05-28 PROCEDURE — 86901 BLOOD TYPING SEROLOGIC RH(D): CPT | Performed by: OBSTETRICS & GYNECOLOGY

## 2024-05-28 PROCEDURE — 36415 COLL VENOUS BLD VENIPUNCTURE: CPT | Performed by: OBSTETRICS & GYNECOLOGY

## 2024-05-28 PROCEDURE — 86780 TREPONEMA PALLIDUM: CPT | Performed by: OBSTETRICS & GYNECOLOGY

## 2024-05-28 PROCEDURE — 63710000001 DIPHENHYDRAMINE PER 50 MG: Performed by: OBSTETRICS & GYNECOLOGY

## 2024-05-28 PROCEDURE — 25810000003 LACTATED RINGERS PER 1000 ML: Performed by: OBSTETRICS & GYNECOLOGY

## 2024-05-28 PROCEDURE — 86900 BLOOD TYPING SEROLOGIC ABO: CPT | Performed by: OBSTETRICS & GYNECOLOGY

## 2024-05-28 PROCEDURE — 25010000002 MORPHINE PER 10 MG: Performed by: OBSTETRICS & GYNECOLOGY

## 2024-05-28 PROCEDURE — 85027 COMPLETE CBC AUTOMATED: CPT | Performed by: OBSTETRICS & GYNECOLOGY

## 2024-05-28 PROCEDURE — 86850 RBC ANTIBODY SCREEN: CPT | Performed by: OBSTETRICS & GYNECOLOGY

## 2024-05-28 RX ORDER — OXYTOCIN/0.9 % SODIUM CHLORIDE 30/500 ML
2-20 PLASTIC BAG, INJECTION (ML) INTRAVENOUS
Status: DISCONTINUED | OUTPATIENT
Start: 2024-05-28 | End: 2024-05-29

## 2024-05-28 RX ORDER — ONDANSETRON 4 MG/1
4 TABLET, ORALLY DISINTEGRATING ORAL EVERY 6 HOURS PRN
Status: DISCONTINUED | OUTPATIENT
Start: 2024-05-28 | End: 2024-05-29

## 2024-05-28 RX ORDER — ACETAMINOPHEN 325 MG/1
650 TABLET ORAL EVERY 4 HOURS PRN
Status: DISCONTINUED | OUTPATIENT
Start: 2024-05-28 | End: 2024-05-29

## 2024-05-28 RX ORDER — DIPHENHYDRAMINE HCL 25 MG
25 CAPSULE ORAL NIGHTLY PRN
Status: COMPLETED | OUTPATIENT
Start: 2024-05-28 | End: 2024-05-28

## 2024-05-28 RX ORDER — TERBUTALINE SULFATE 1 MG/ML
0.25 INJECTION, SOLUTION SUBCUTANEOUS AS NEEDED
Status: DISCONTINUED | OUTPATIENT
Start: 2024-05-28 | End: 2024-05-29

## 2024-05-28 RX ORDER — PENICILLIN G 3000000 [IU]/50ML
3 INJECTION, SOLUTION INTRAVENOUS EVERY 4 HOURS
Qty: 600 ML | Refills: 0 | Status: DISCONTINUED | OUTPATIENT
Start: 2024-05-28 | End: 2024-05-28

## 2024-05-28 RX ORDER — PROMETHAZINE HYDROCHLORIDE 25 MG/1
12.5 TABLET ORAL EVERY 6 HOURS PRN
Status: DISCONTINUED | OUTPATIENT
Start: 2024-05-28 | End: 2024-05-29

## 2024-05-28 RX ORDER — PROMETHAZINE HYDROCHLORIDE 12.5 MG/1
12.5 SUPPOSITORY RECTAL EVERY 6 HOURS PRN
Status: DISCONTINUED | OUTPATIENT
Start: 2024-05-28 | End: 2024-05-29

## 2024-05-28 RX ORDER — SODIUM CHLORIDE 9 MG/ML
40 INJECTION, SOLUTION INTRAVENOUS AS NEEDED
Status: DISCONTINUED | OUTPATIENT
Start: 2024-05-28 | End: 2024-05-29

## 2024-05-28 RX ORDER — MORPHINE SULFATE 10 MG/ML
10 INJECTION INTRAMUSCULAR; INTRAVENOUS; SUBCUTANEOUS
Status: DISCONTINUED | OUTPATIENT
Start: 2024-05-28 | End: 2024-05-29

## 2024-05-28 RX ORDER — SODIUM CHLORIDE 0.9 % (FLUSH) 0.9 %
10 SYRINGE (ML) INJECTION AS NEEDED
Status: DISCONTINUED | OUTPATIENT
Start: 2024-05-28 | End: 2024-05-29

## 2024-05-28 RX ORDER — PENICILLIN G 3000000 [IU]/50ML
3 INJECTION, SOLUTION INTRAVENOUS EVERY 4 HOURS
Qty: 600 ML | Refills: 0 | Status: DISCONTINUED | OUTPATIENT
Start: 2024-05-29 | End: 2024-05-29

## 2024-05-28 RX ORDER — SODIUM CHLORIDE, SODIUM LACTATE, POTASSIUM CHLORIDE, CALCIUM CHLORIDE 600; 310; 30; 20 MG/100ML; MG/100ML; MG/100ML; MG/100ML
125 INJECTION, SOLUTION INTRAVENOUS CONTINUOUS
Status: DISCONTINUED | OUTPATIENT
Start: 2024-05-28 | End: 2024-05-29

## 2024-05-28 RX ORDER — ONDANSETRON 2 MG/ML
4 INJECTION INTRAMUSCULAR; INTRAVENOUS EVERY 6 HOURS PRN
Status: DISCONTINUED | OUTPATIENT
Start: 2024-05-28 | End: 2024-05-29

## 2024-05-28 RX ORDER — SODIUM CHLORIDE 0.9 % (FLUSH) 0.9 %
10 SYRINGE (ML) INJECTION EVERY 12 HOURS SCHEDULED
Status: DISCONTINUED | OUTPATIENT
Start: 2024-05-28 | End: 2024-05-29

## 2024-05-28 RX ADMIN — DIPHENHYDRAMINE HYDROCHLORIDE 25 MG: 25 CAPSULE ORAL at 22:36

## 2024-05-28 RX ADMIN — SODIUM CHLORIDE, POTASSIUM CHLORIDE, SODIUM LACTATE AND CALCIUM CHLORIDE 125 ML/HR: 600; 310; 30; 20 INJECTION, SOLUTION INTRAVENOUS at 16:30

## 2024-05-28 RX ADMIN — MORPHINE SULFATE 10 MG: 10 INJECTION INTRAVENOUS at 23:55

## 2024-05-28 RX ADMIN — Medication 2 MILLI-UNITS/MIN: at 17:18

## 2024-05-28 RX ADMIN — SODIUM CHLORIDE, POTASSIUM CHLORIDE, SODIUM LACTATE AND CALCIUM CHLORIDE 125 ML/HR: 600; 310; 30; 20 INJECTION, SOLUTION INTRAVENOUS at 22:36

## 2024-05-28 NOTE — PROGRESS NOTES
Uncomfortable, but not hurting or sharee.  No LOF or VB  Good FM  Patient desires elective IOL and will head to L&D.  Diagnoses and all orders for this visit:    1. 39 weeks gestation of pregnancy (Primary)  -     POC Urinalysis Dipstick    2. Multigravida in third trimester    3. Rh negative, antepartum

## 2024-05-28 NOTE — H&P
[facility]  Edilma Sanchez  : 1990  MRN: [id number]  CSN: [id number]    History and Physical    Subjective   Edilma Sanchez is a 34 y.o. year old  with an Estimated Date of Delivery: 24 presented to the office today for routine OB visit at 39 weeks and 5 days gestation.  The patient is exhausted and desires elective induction of labor.  Prenatal care has been with Dr. Edilma Calabrese.  It has been benign, except for trichomonas on new OB visit.    OB History    Para Term  AB Living   2 1 1 0 0 1   SAB IAB Ectopic Molar Multiple Live Births   0 0 0 0 0 0      # Outcome Date GA Lbr Mulugeta/2nd Weight Sex Type Anes PTL Lv   2 Current            1 Term 06/15/13    F Vag-Spont        Past Medical History:   Diagnosis Date    Depression      Past Surgical History:   Procedure Laterality Date    INCISION AND DRAINAGE TRUNK       No current outpatient medications on file.    Allergies   Allergen Reactions    Bactrim [Sulfamethoxazole-Trimethoprim] Unknown - Low Severity     Social History    Tobacco Use      Smoking status: Former        Types: Cigarettes        Passive exposure: Never      Smokeless tobacco: Never    Review of Systems   Constitutional:  Positive for fatigue. Negative for activity change and unexpected weight change.   Respiratory:  Negative for shortness of breath.    Cardiovascular:  Negative for chest pain.   Gastrointestinal:  Negative for abdominal pain.   Genitourinary:  Positive for pelvic pain. Negative for vaginal bleeding and vaginal discharge.         Objective   /88   Wt 85.7 kg (189 lb)   LMP 2023 (Approximate)   BMI 31.45 kg/m²   General: well developed; well nourished  no acute distress   Heart: Not performed.   Lungs: breathing is unlabored   Abdomen:    Cervix:  Presentation:  EFW by Leopold's:  EFW by recent u/s: soft, non-tender; no masses  fundus firm and non-tender  was checked (by me): 1 cm / 10 % / -3  Vertex    EFW 56% at 32 weeks      Prenatal Labs  Lab Results   Component Value Date    HGB 10.4 (L) 2024    HEPBSAG Negative 2023    ABO O 2023    RH Negative 2023    ABSCRN Negative 2024    [id number] Non Reactive 2023    URINECX Final report 2023       Recent Labs  Lab Results   Component Value Date    HGB 10.4 (L) 2024    HCT 40.9 2023    WBC 9.73 2023     2023           Assessment   IUP with an Estimated Date of Delivery: 24  Induction of labor today for elective - patient's request.  The patient's pelvis feels clinically adequate for IOL to be appropriate, although she understands that this clinical judgement is not always accurate.  Group B Strep status: positive.  Will plan to start penicillin prophylaxis after low-dose Pitocin ripening         Plan   Risks and benefits of induction discussed.  Patient understands that IOL increases the risk of  delivery over spontaneous labor, especially if the patient does not have a favorable cervix.  Patient understands that labor proceeds better when the patient presents in spontaneous labor, and understands that an elective induction will be more prolonged    Edilma Calabrese MD  2024

## 2024-05-29 ENCOUNTER — ANESTHESIA EVENT (OUTPATIENT)
Dept: LABOR AND DELIVERY | Facility: HOSPITAL | Age: 34
End: 2024-05-29
Payer: COMMERCIAL

## 2024-05-29 ENCOUNTER — ANESTHESIA (OUTPATIENT)
Dept: LABOR AND DELIVERY | Facility: HOSPITAL | Age: 34
End: 2024-05-29
Payer: COMMERCIAL

## 2024-05-29 PROBLEM — O99.019 MATERNAL ANEMIA IN PREGNANCY, ANTEPARTUM: Status: RESOLVED | Noted: 2024-03-20 | Resolved: 2024-05-29

## 2024-05-29 PROBLEM — Z64.1 MULTIGRAVIDA: Status: RESOLVED | Noted: 2023-11-13 | Resolved: 2024-05-29

## 2024-05-29 PROBLEM — Z34.90 PREGNANCY: Status: RESOLVED | Noted: 2023-11-01 | Resolved: 2024-05-29

## 2024-05-29 LAB — T PALLIDUM IGG SER QL: NORMAL

## 2024-05-29 PROCEDURE — 59400 OBSTETRICAL CARE: CPT | Performed by: OBSTETRICS & GYNECOLOGY

## 2024-05-29 PROCEDURE — 51701 INSERT BLADDER CATHETER: CPT

## 2024-05-29 PROCEDURE — 51702 INSERT TEMP BLADDER CATH: CPT

## 2024-05-29 PROCEDURE — C1755 CATHETER, INTRASPINAL: HCPCS

## 2024-05-29 PROCEDURE — 25010000002 ROPIVACAINE PER 1 MG

## 2024-05-29 PROCEDURE — 25010000002 MORPHINE PER 10 MG: Performed by: OBSTETRICS & GYNECOLOGY

## 2024-05-29 PROCEDURE — 25010000002 PENICILLIN G POTASSIUM PER 600000 UNITS: Performed by: OBSTETRICS & GYNECOLOGY

## 2024-05-29 PROCEDURE — 25810000003 LACTATED RINGERS PER 1000 ML: Performed by: OBSTETRICS & GYNECOLOGY

## 2024-05-29 PROCEDURE — 0HQ9XZZ REPAIR PERINEUM SKIN, EXTERNAL APPROACH: ICD-10-PCS | Performed by: OBSTETRICS & GYNECOLOGY

## 2024-05-29 PROCEDURE — 88307 TISSUE EXAM BY PATHOLOGIST: CPT | Performed by: OBSTETRICS & GYNECOLOGY

## 2024-05-29 RX ORDER — ACETAMINOPHEN 325 MG/1
650 TABLET ORAL EVERY 6 HOURS PRN
Status: DISCONTINUED | OUTPATIENT
Start: 2024-05-29 | End: 2024-05-31 | Stop reason: HOSPADM

## 2024-05-29 RX ORDER — HYDROCODONE BITARTRATE AND ACETAMINOPHEN 5; 325 MG/1; MG/1
1 TABLET ORAL EVERY 4 HOURS PRN
Status: DISCONTINUED | OUTPATIENT
Start: 2024-05-29 | End: 2024-05-30

## 2024-05-29 RX ORDER — DOCUSATE SODIUM 100 MG/1
100 CAPSULE, LIQUID FILLED ORAL 2 TIMES DAILY
Status: DISCONTINUED | OUTPATIENT
Start: 2024-05-29 | End: 2024-05-31 | Stop reason: HOSPADM

## 2024-05-29 RX ORDER — LIDOCAINE HYDROCHLORIDE AND EPINEPHRINE 15; 5 MG/ML; UG/ML
INJECTION, SOLUTION EPIDURAL
Status: COMPLETED | OUTPATIENT
Start: 2024-05-29 | End: 2024-05-29

## 2024-05-29 RX ORDER — HYDROCODONE BITARTRATE AND ACETAMINOPHEN 10; 325 MG/1; MG/1
1 TABLET ORAL EVERY 4 HOURS PRN
Status: DISCONTINUED | OUTPATIENT
Start: 2024-05-29 | End: 2024-05-30

## 2024-05-29 RX ORDER — MISOPROSTOL 200 UG/1
800 TABLET ORAL ONCE AS NEEDED
Status: DISCONTINUED | OUTPATIENT
Start: 2024-05-29 | End: 2024-05-29

## 2024-05-29 RX ORDER — CALCIUM CARBONATE 500 MG/1
2 TABLET, CHEWABLE ORAL 3 TIMES DAILY PRN
Status: DISCONTINUED | OUTPATIENT
Start: 2024-05-29 | End: 2024-05-29

## 2024-05-29 RX ORDER — FAMOTIDINE 10 MG/ML
20 INJECTION, SOLUTION INTRAVENOUS ONCE AS NEEDED
OUTPATIENT
Start: 2024-05-29

## 2024-05-29 RX ORDER — ONDANSETRON 2 MG/ML
4 INJECTION INTRAMUSCULAR; INTRAVENOUS EVERY 6 HOURS PRN
Status: DISCONTINUED | OUTPATIENT
Start: 2024-05-29 | End: 2024-05-29

## 2024-05-29 RX ORDER — MISOPROSTOL 200 UG/1
600 TABLET ORAL ONCE
Status: DISCONTINUED | OUTPATIENT
Start: 2024-05-29 | End: 2024-05-31 | Stop reason: HOSPADM

## 2024-05-29 RX ORDER — ACETAMINOPHEN 325 MG/1
650 TABLET ORAL EVERY 4 HOURS PRN
Status: DISCONTINUED | OUTPATIENT
Start: 2024-05-29 | End: 2024-05-29

## 2024-05-29 RX ORDER — ONDANSETRON 2 MG/ML
4 INJECTION INTRAMUSCULAR; INTRAVENOUS EVERY 6 HOURS PRN
Status: DISCONTINUED | OUTPATIENT
Start: 2024-05-29 | End: 2024-05-31 | Stop reason: HOSPADM

## 2024-05-29 RX ORDER — METHYLERGONOVINE MALEATE 0.2 MG/ML
200 INJECTION INTRAVENOUS ONCE AS NEEDED
Status: DISCONTINUED | OUTPATIENT
Start: 2024-05-29 | End: 2024-05-29

## 2024-05-29 RX ORDER — IBUPROFEN 600 MG/1
600 TABLET ORAL EVERY 6 HOURS PRN
Status: DISCONTINUED | OUTPATIENT
Start: 2024-05-29 | End: 2024-05-31 | Stop reason: HOSPADM

## 2024-05-29 RX ORDER — CARBOPROST TROMETHAMINE 250 UG/ML
250 INJECTION, SOLUTION INTRAMUSCULAR
Status: DISCONTINUED | OUTPATIENT
Start: 2024-05-29 | End: 2024-05-29

## 2024-05-29 RX ORDER — SODIUM CHLORIDE 0.9 % (FLUSH) 0.9 %
1-10 SYRINGE (ML) INJECTION AS NEEDED
Status: DISCONTINUED | OUTPATIENT
Start: 2024-05-29 | End: 2024-05-31 | Stop reason: HOSPADM

## 2024-05-29 RX ORDER — ONDANSETRON 4 MG/1
4 TABLET, ORALLY DISINTEGRATING ORAL EVERY 6 HOURS PRN
Status: DISCONTINUED | OUTPATIENT
Start: 2024-05-29 | End: 2024-05-29

## 2024-05-29 RX ORDER — PROMETHAZINE HYDROCHLORIDE 12.5 MG/1
12.5 SUPPOSITORY RECTAL EVERY 6 HOURS PRN
Status: DISCONTINUED | OUTPATIENT
Start: 2024-05-29 | End: 2024-05-29

## 2024-05-29 RX ORDER — PROMETHAZINE HYDROCHLORIDE 25 MG/1
25 TABLET ORAL EVERY 6 HOURS PRN
Status: DISCONTINUED | OUTPATIENT
Start: 2024-05-29 | End: 2024-05-31 | Stop reason: HOSPADM

## 2024-05-29 RX ORDER — OXYTOCIN/0.9 % SODIUM CHLORIDE 30/500 ML
250 PLASTIC BAG, INJECTION (ML) INTRAVENOUS CONTINUOUS
Status: ACTIVE | OUTPATIENT
Start: 2024-05-29 | End: 2024-05-29

## 2024-05-29 RX ORDER — OXYTOCIN/0.9 % SODIUM CHLORIDE 30/500 ML
999 PLASTIC BAG, INJECTION (ML) INTRAVENOUS ONCE
Status: DISCONTINUED | OUTPATIENT
Start: 2024-05-29 | End: 2024-05-29 | Stop reason: HOSPADM

## 2024-05-29 RX ORDER — FAMOTIDINE 20 MG/1
20 TABLET, FILM COATED ORAL ONCE AS NEEDED
Status: DISCONTINUED | OUTPATIENT
Start: 2024-05-29 | End: 2024-05-29

## 2024-05-29 RX ORDER — ROPIVACAINE HYDROCHLORIDE 2 MG/ML
INJECTION, SOLUTION EPIDURAL; INFILTRATION; PERINEURAL AS NEEDED
Status: DISCONTINUED | OUTPATIENT
Start: 2024-05-29 | End: 2024-05-29 | Stop reason: SURG

## 2024-05-29 RX ORDER — CITRIC ACID/SODIUM CITRATE 334-500MG
30 SOLUTION, ORAL ORAL ONCE
Status: COMPLETED | OUTPATIENT
Start: 2024-05-29 | End: 2024-05-29

## 2024-05-29 RX ORDER — NALOXONE HCL 0.4 MG/ML
0.4 VIAL (ML) INJECTION
Status: DISCONTINUED | OUTPATIENT
Start: 2024-05-29 | End: 2024-05-31 | Stop reason: HOSPADM

## 2024-05-29 RX ORDER — PROMETHAZINE HYDROCHLORIDE 25 MG/1
12.5 TABLET ORAL EVERY 6 HOURS PRN
Status: DISCONTINUED | OUTPATIENT
Start: 2024-05-29 | End: 2024-05-29

## 2024-05-29 RX ORDER — HYDROCORTISONE 25 MG/G
1 CREAM TOPICAL AS NEEDED
Status: DISCONTINUED | OUTPATIENT
Start: 2024-05-29 | End: 2024-05-31 | Stop reason: HOSPADM

## 2024-05-29 RX ORDER — BISACODYL 10 MG
10 SUPPOSITORY, RECTAL RECTAL DAILY PRN
Status: DISCONTINUED | OUTPATIENT
Start: 2024-05-30 | End: 2024-05-31 | Stop reason: HOSPADM

## 2024-05-29 RX ORDER — MORPHINE SULFATE 2 MG/ML
1 INJECTION, SOLUTION INTRAMUSCULAR; INTRAVENOUS EVERY 4 HOURS PRN
Status: DISCONTINUED | OUTPATIENT
Start: 2024-05-29 | End: 2024-05-30

## 2024-05-29 RX ORDER — PROMETHAZINE HYDROCHLORIDE 12.5 MG/1
12.5 SUPPOSITORY RECTAL EVERY 6 HOURS PRN
Status: DISCONTINUED | OUTPATIENT
Start: 2024-05-29 | End: 2024-05-31 | Stop reason: HOSPADM

## 2024-05-29 RX ORDER — ONDANSETRON 4 MG/1
4 TABLET, ORALLY DISINTEGRATING ORAL EVERY 8 HOURS PRN
Status: DISCONTINUED | OUTPATIENT
Start: 2024-05-29 | End: 2024-05-31 | Stop reason: HOSPADM

## 2024-05-29 RX ORDER — CALCIUM CARBONATE 500 MG/1
2 TABLET, CHEWABLE ORAL 3 TIMES DAILY PRN
Status: DISCONTINUED | OUTPATIENT
Start: 2024-05-29 | End: 2024-05-31 | Stop reason: HOSPADM

## 2024-05-29 RX ORDER — FAMOTIDINE 10 MG/ML
20 INJECTION, SOLUTION INTRAVENOUS ONCE AS NEEDED
Status: DISCONTINUED | OUTPATIENT
Start: 2024-05-29 | End: 2024-05-29

## 2024-05-29 RX ORDER — CARBOPROST TROMETHAMINE 250 UG/ML
250 INJECTION, SOLUTION INTRAMUSCULAR ONCE
Status: DISCONTINUED | OUTPATIENT
Start: 2024-05-29 | End: 2024-05-31 | Stop reason: HOSPADM

## 2024-05-29 RX ORDER — OXYTOCIN/0.9 % SODIUM CHLORIDE 30/500 ML
125 PLASTIC BAG, INJECTION (ML) INTRAVENOUS ONCE AS NEEDED
Status: COMPLETED | OUTPATIENT
Start: 2024-05-29 | End: 2024-05-29

## 2024-05-29 RX ORDER — METHYLERGONOVINE MALEATE 0.2 MG/ML
200 INJECTION INTRAVENOUS ONCE
Status: DISCONTINUED | OUTPATIENT
Start: 2024-05-29 | End: 2024-05-31 | Stop reason: HOSPADM

## 2024-05-29 RX ORDER — EPHEDRINE SULFATE 50 MG/ML
10 INJECTION, SOLUTION INTRAVENOUS
Status: DISCONTINUED | OUTPATIENT
Start: 2024-05-29 | End: 2024-05-29

## 2024-05-29 RX ADMIN — Medication: at 19:20

## 2024-05-29 RX ADMIN — SODIUM CHLORIDE, POTASSIUM CHLORIDE, SODIUM LACTATE AND CALCIUM CHLORIDE 125 ML/HR: 600; 310; 30; 20 INJECTION, SOLUTION INTRAVENOUS at 05:26

## 2024-05-29 RX ADMIN — ACETAMINOPHEN 650 MG: 325 TABLET, FILM COATED ORAL at 05:24

## 2024-05-29 RX ADMIN — EPHEDRINE SULFATE 10 MG: 50 INJECTION INTRAVENOUS at 10:08

## 2024-05-29 RX ADMIN — DOCUSATE SODIUM 100 MG: 100 CAPSULE, LIQUID FILLED ORAL at 20:45

## 2024-05-29 RX ADMIN — PENICILLIN G 3 MILLION UNITS: 3000000 INJECTION, SOLUTION INTRAVENOUS at 09:20

## 2024-05-29 RX ADMIN — MORPHINE SULFATE 10 MG: 10 INJECTION INTRAVENOUS at 05:53

## 2024-05-29 RX ADMIN — ROPIVACAINE HYDROCHLORIDE 6 ML/HR: 2 INJECTION, SOLUTION EPIDURAL; INFILTRATION at 06:56

## 2024-05-29 RX ADMIN — MORPHINE SULFATE 10 MG: 10 INJECTION INTRAVENOUS at 03:08

## 2024-05-29 RX ADMIN — SODIUM CHLORIDE 5 MILLION UNITS: 900 INJECTION INTRAVENOUS at 05:24

## 2024-05-29 RX ADMIN — IBUPROFEN 600 MG: 600 TABLET, FILM COATED ORAL at 15:04

## 2024-05-29 RX ADMIN — Medication 125 ML/HR: at 15:05

## 2024-05-29 RX ADMIN — LIDOCAINE HYDROCHLORIDE AND EPINEPHRINE 3 ML: 15; 5 INJECTION, SOLUTION EPIDURAL at 06:52

## 2024-05-29 RX ADMIN — ROPIVACAINE HYDROCHLORIDE 8 ML: 2 INJECTION, SOLUTION EPIDURAL; INFILTRATION at 06:52

## 2024-05-29 RX ADMIN — SODIUM CITRATE AND CITRIC ACID MONOHYDRATE 30 ML: 500; 334 SOLUTION ORAL at 09:20

## 2024-05-29 NOTE — PLAN OF CARE
Goal Outcome Evaluation:  Plan of Care Reviewed With: patient        Progress: improving  Outcome Evaluation:  Admitted for elective IOL. Pt denies complaints at this time. VSS, will continue to monitor.

## 2024-05-29 NOTE — ANESTHESIA PROCEDURE NOTES
Labor Epidural      Patient location during procedure: OB  Performed By  CRNA/CAA: Michael Ulloa CRNA  Preanesthetic Checklist  Completed: patient identified, IV checked, site marked, risks and benefits discussed, surgical consent, monitors and equipment checked, pre-op evaluation and timeout performed  Prep:  Pt Position:sitting  Sterile Tech:gloves, cap and sterile barrier  Monitoring:continuous pulse oximetry, EKG and blood pressure monitoring  Epidural Block Procedure:  Approach:midline  Guidance:landmark technique and palpation technique  Location:L4-L5  Needle Type:Tuohy  Needle Gauge:18 G  Loss of Resistance Medium: saline  Loss of Resistance: 6cm  Cath Depth at skin:14 cm  Paresthesia: none  Aspiration:negative  Test Dose:negative  Medication: lidocaine 1.5%-EPINEPHrine 1:200,000 (XYLOCAINE W/EPI) injection - Injection   3 mL - 5/29/2024 6:52:00 AM  Number of Attempts: 1  Post Assessment:  Dressing:secured with tape and occlusive dressing applied  Pt Tolerance:patient tolerated the procedure well with no apparent complications  Complications:no

## 2024-05-29 NOTE — PLAN OF CARE
Problem: Adult Inpatient Plan of Care  Goal: Plan of Care Review  Outcome: Ongoing, Progressing  Flowsheets (Taken 2024 1534)  Progress: improving  Plan of Care Reviewed With: patient  Outcome Evaluation: . U1 FF ML Scant. i&o cath bc right of midline now back midline. controling pain with po med. tolerating po intake. due to void. passing gas. family at bedside  Goal: Patient-Specific Goal (Individualized)  Outcome: Ongoing, Progressing  Goal: Absence of Hospital-Acquired Illness or Injury  Outcome: Ongoing, Progressing  Intervention: Identify and Manage Fall Risk  Recent Flowsheet Documentation  Taken 2024 1504 by Lars Shea RN  Safety Promotion/Fall Prevention: safety round/check completed  Taken 2024 1415 by Lars Shea RN  Safety Promotion/Fall Prevention: safety round/check completed  Taken 2024 1300 by Lars Shea RN  Safety Promotion/Fall Prevention: safety round/check completed  Taken 2024 1200 by Lars Shea RN  Safety Promotion/Fall Prevention: safety round/check completed  Taken 2024 1110 by Lars Shea RN  Safety Promotion/Fall Prevention: safety round/check completed  Taken 2024 1045 by Lars Shea RN  Safety Promotion/Fall Prevention: safety round/check completed  Taken 2024 1000 by Lars Shea RN  Safety Promotion/Fall Prevention: safety round/check completed  Taken 2024 0930 by Lars Shea RN  Safety Promotion/Fall Prevention: safety round/check completed  Taken 2024 0900 by Lars Shea RN  Safety Promotion/Fall Prevention: safety round/check completed  Taken 2024 0800 by Lars Shea RN  Safety Promotion/Fall Prevention: safety round/check completed  Taken 2024 0705 by Lars Shea RN  Safety Promotion/Fall Prevention: safety round/check completed  Intervention: Prevent Skin Injury  Recent Flowsheet  Documentation  Taken 5/29/2024 1430 by Lars Shea RN  Body Position: position changed independently  Taken 5/29/2024 1415 by Lars Shea RN  Body Position: position changed independently  Taken 5/29/2024 0705 by Lars Shea RN  Body Position: position changed independently  Intervention: Prevent and Manage VTE (Venous Thromboembolism) Risk  Recent Flowsheet Documentation  Taken 5/29/2024 1415 by Lars Shea RN  Activity Management: up ad franchesca  Taken 5/29/2024 0705 by Lars Shea RN  Activity Management: up ad franchesca  Goal: Optimal Comfort and Wellbeing  Outcome: Ongoing, Progressing  Intervention: Monitor Pain and Promote Comfort  Recent Flowsheet Documentation  Taken 5/29/2024 1504 by Lars Shea RN  Pain Management Interventions: see MAR  Taken 5/29/2024 1500 by Lars Shea RN  Pain Management Interventions:   pain management plan reviewed with patient/caregiver   no interventions per patient request  Taken 5/29/2024 1445 by Lars Shea RN  Pain Management Interventions:   pain management plan reviewed with patient/caregiver   no interventions per patient request  Taken 5/29/2024 1430 by Lars Shea RN  Pain Management Interventions:   pain management plan reviewed with patient/caregiver   no interventions per patient request  Taken 5/29/2024 1415 by Lars Shea RN  Pain Management Interventions:   pain management plan reviewed with patient/caregiver   no interventions per patient request  Taken 5/29/2024 1300 by Lars Shea RN  Pain Management Interventions:   pain management plan reviewed with patient/caregiver   prescribed exercises encouraged   no interventions per patient request   premedicated for activity  Taken 5/29/2024 1200 by Lars Shea RN  Pain Management Interventions:   pain management plan reviewed with patient/caregiver   no interventions per patient request  Taken  5/29/2024 1110 by Lars Shea RN  Pain Management Interventions:   pain management plan reviewed with patient/caregiver   no interventions per patient request  Taken 5/29/2024 1045 by Lars Shea RN  Pain Management Interventions:   pain management plan reviewed with patient/caregiver   no interventions per patient request  Taken 5/29/2024 1000 by Lars Shea RN  Pain Management Interventions:   pain management plan reviewed with patient/caregiver   no interventions per patient request  Taken 5/29/2024 0930 by Lars Shea RN  Pain Management Interventions:   pain management plan reviewed with patient/caregiver   no interventions per patient request  Taken 5/29/2024 0900 by Lars Shea RN  Pain Management Interventions:   pain management plan reviewed with patient/caregiver   no interventions per patient request  Taken 5/29/2024 0800 by Lars Shea RN  Pain Management Interventions:   pain management plan reviewed with patient/caregiver   no interventions per patient request  Taken 5/29/2024 0705 by Lars Shea RN  Pain Management Interventions:   pain management plan reviewed with patient/caregiver   no interventions per patient request  Intervention: Provide Person-Centered Care  Recent Flowsheet Documentation  Taken 5/29/2024 1415 by Lars Shea RN  Trust Relationship/Rapport:   care explained   choices provided  Taken 5/29/2024 0705 by Lars Shea RN  Trust Relationship/Rapport:   care explained   choices provided  Goal: Readiness for Transition of Care  Outcome: Ongoing, Progressing     Problem: Adjustment to Role Transition (Postpartum Vaginal Delivery)  Goal: Successful Maternal Role Transition  Outcome: Ongoing, Progressing     Problem: Bleeding (Postpartum Vaginal Delivery)  Goal: Hemostasis  Outcome: Ongoing, Progressing     Problem: Infection (Postpartum Vaginal Delivery)  Goal: Absence of Infection  Signs/Symptoms  Outcome: Ongoing, Progressing     Problem: Pain (Postpartum Vaginal Delivery)  Goal: Acceptable Pain Control  Outcome: Ongoing, Progressing  Intervention: Prevent or Manage Pain  Recent Flowsheet Documentation  Taken 5/29/2024 1504 by Lars Shea RN  Pain Management Interventions: see MAR  Taken 5/29/2024 1500 by Lars Shea RN  Pain Management Interventions:   pain management plan reviewed with patient/caregiver   no interventions per patient request  Taken 5/29/2024 1445 by Lars Shea RN  Pain Management Interventions:   pain management plan reviewed with patient/caregiver   no interventions per patient request  Taken 5/29/2024 1430 by Lars Shea RN  Pain Management Interventions:   pain management plan reviewed with patient/caregiver   no interventions per patient request  Taken 5/29/2024 1415 by Lars Shea RN  Pain Management Interventions:   pain management plan reviewed with patient/caregiver   no interventions per patient request  Taken 5/29/2024 1300 by Lars Shea RN  Pain Management Interventions:   pain management plan reviewed with patient/caregiver   prescribed exercises encouraged   no interventions per patient request   premedicated for activity  Taken 5/29/2024 1200 by Lars Shea RN  Pain Management Interventions:   pain management plan reviewed with patient/caregiver   no interventions per patient request  Taken 5/29/2024 1110 by Lars Shea RN  Pain Management Interventions:   pain management plan reviewed with patient/caregiver   no interventions per patient request  Taken 5/29/2024 1045 by Lars Shea RN  Pain Management Interventions:   pain management plan reviewed with patient/caregiver   no interventions per patient request  Taken 5/29/2024 1000 by Lars Shea RN  Pain Management Interventions:   pain management plan reviewed with patient/caregiver   no  interventions per patient request  Taken 5/29/2024 0930 by Lars Shea RN  Pain Management Interventions:   pain management plan reviewed with patient/caregiver   no interventions per patient request  Taken 5/29/2024 0900 by Lars Shea RN  Pain Management Interventions:   pain management plan reviewed with patient/caregiver   no interventions per patient request  Taken 5/29/2024 0800 by Lars Shea RN  Pain Management Interventions:   pain management plan reviewed with patient/caregiver   no interventions per patient request  Taken 5/29/2024 0705 by Lars Shea RN  Pain Management Interventions:   pain management plan reviewed with patient/caregiver   no interventions per patient request     Problem: Urinary Retention (Postpartum Vaginal Delivery)  Goal: Effective Urinary Elimination  Outcome: Ongoing, Progressing     Problem: Device-Related Complication Risk (Anesthesia/Analgesia, Neuraxial)  Goal: Safe Infusion Delivery Completion  Outcome: Ongoing, Progressing     Problem: Infection (Anesthesia/Analgesia, Neuraxial)  Goal: Absence of Infection Signs and Symptoms  Outcome: Ongoing, Progressing     Problem: Nausea and Vomiting (Anesthesia/Analgesia, Neuraxial)  Goal: Nausea and Vomiting Relief  Outcome: Ongoing, Progressing     Problem: Pain (Anesthesia/Analgesia, Neuraxial)  Goal: Effective Pain Control  Outcome: Ongoing, Progressing  Intervention: Prevent or Manage Pain  Recent Flowsheet Documentation  Taken 5/29/2024 1504 by Lars Shea RN  Pain Management Interventions: see MAR  Taken 5/29/2024 1500 by Lars Shea RN  Pain Management Interventions:   pain management plan reviewed with patient/caregiver   no interventions per patient request  Taken 5/29/2024 1445 by Lars Shea RN  Pain Management Interventions:   pain management plan reviewed with patient/caregiver   no interventions per patient request  Taken 5/29/2024 1430 by  Lars Shea RN  Pain Management Interventions:   pain management plan reviewed with patient/caregiver   no interventions per patient request  Taken 5/29/2024 1415 by Lars Shea RN  Pain Management Interventions:   pain management plan reviewed with patient/caregiver   no interventions per patient request  Taken 5/29/2024 1300 by Lars Shea RN  Pain Management Interventions:   pain management plan reviewed with patient/caregiver   prescribed exercises encouraged   no interventions per patient request   premedicated for activity  Taken 5/29/2024 1200 by Lars Shea RN  Pain Management Interventions:   pain management plan reviewed with patient/caregiver   no interventions per patient request  Taken 5/29/2024 1110 by Lars Shea RN  Pain Management Interventions:   pain management plan reviewed with patient/caregiver   no interventions per patient request  Taken 5/29/2024 1045 by Lars Shea RN  Pain Management Interventions:   pain management plan reviewed with patient/caregiver   no interventions per patient request  Taken 5/29/2024 1000 by Lars Shea RN  Pain Management Interventions:   pain management plan reviewed with patient/caregiver   no interventions per patient request  Taken 5/29/2024 0930 by Lars Shea RN  Pain Management Interventions:   pain management plan reviewed with patient/caregiver   no interventions per patient request  Taken 5/29/2024 0900 by Lars Shea RN  Pain Management Interventions:   pain management plan reviewed with patient/caregiver   no interventions per patient request  Taken 5/29/2024 0800 by Lars Shea RN  Pain Management Interventions:   pain management plan reviewed with patient/caregiver   no interventions per patient request  Taken 5/29/2024 0705 by Lars Shea RN  Pain Management Interventions:   pain management plan reviewed with patient/caregiver   no  interventions per patient request     Problem: Respiratory Compromise (Anesthesia/Analgesia, Neuraxial)  Goal: Effective Oxygenation and Ventilation  Outcome: Ongoing, Progressing  Intervention: Optimize Oxygenation and Ventilation  Recent Flowsheet Documentation  Taken 5/29/2024 1430 by Lars Shea RN  Head of Bed (HOB) Positioning: HOB elevated  Taken 5/29/2024 1415 by Lars Shea RN  Head of Bed (HOB) Positioning: HOB elevated  Taken 5/29/2024 0705 by Lars Shea RN  Head of Bed (HOB) Positioning: HOB elevated     Problem: Sensorimotor Impairment (Anesthesia/Analgesia, Neuraxial)  Goal: Baseline Motor Function  Outcome: Ongoing, Progressing  Intervention: Optimize Sensorimotor Function  Recent Flowsheet Documentation  Taken 5/29/2024 1504 by Lars Shea RN  Safety Promotion/Fall Prevention: safety round/check completed  Taken 5/29/2024 1415 by Lars Shea RN  Safety Promotion/Fall Prevention: safety round/check completed  Taken 5/29/2024 1300 by Lars Shea RN  Safety Promotion/Fall Prevention: safety round/check completed  Taken 5/29/2024 1200 by Lars Shea RN  Safety Promotion/Fall Prevention: safety round/check completed  Taken 5/29/2024 1110 by Lars Shea RN  Safety Promotion/Fall Prevention: safety round/check completed  Taken 5/29/2024 1045 by Lars Shea RN  Safety Promotion/Fall Prevention: safety round/check completed  Taken 5/29/2024 1000 by Lars Shea RN  Safety Promotion/Fall Prevention: safety round/check completed  Taken 5/29/2024 0930 by Lars Shea RN  Safety Promotion/Fall Prevention: safety round/check completed  Taken 5/29/2024 0900 by Lars Shea RN  Safety Promotion/Fall Prevention: safety round/check completed  Taken 5/29/2024 0800 by Lars Shea RN  Safety Promotion/Fall Prevention: safety round/check completed  Taken 5/29/2024 0705 by  Lars Shea, RN  Safety Promotion/Fall Prevention: safety round/check completed     Problem: Urinary Retention (Anesthesia/Analgesia, Neuraxial)  Goal: Effective Urinary Elimination  Outcome: Ongoing, Progressing   Goal Outcome Evaluation:  Plan of Care Reviewed With: patient        Progress: improving  Outcome Evaluation: . U1 FF ML Scant. i&o cath bc right of midline now back midline. controling pain with po med. tolerating po intake. due to void. passing gas. family at bedside

## 2024-05-29 NOTE — LACTATION NOTE
Mother's Name: Edilma Sanchez  Phone #: [phone]  Infant Name: Floyd Seymour  : 24  Gestation: 39w6d  Day of life:   Birth weight: 5-14.5 (2680g) SGA Discharge weight:  Weight Loss:   24 hour Summary of Feeds:  Voids:  Stools:  Assistive devices (shields, shells, etc):  Significant Maternal history: , Depression  Maternal Concerns: None at this time   Maternal Goal: Breastfeed-did not breastfeed first child  Mother's Medications: PNV, FE, Pepcid  Breastpump for home: [facility] has Rx per patient. Notified [facility] of delivery.  Ped follow up appt: LAYLA Rondon    Assisted with first feeding in LDR after delivery. Able to easily express drops of colostrum. Infant disorganized at first. After several attempts, infant was able to latch deeply with wide gape and flanged lips. Intermittent deep jaw drops observed with audible swallows noted. Initial breastfeeding education provided. Patient exhausted and drowsy. May need to reiterate education. Discussed expected infant weight loss/output, benefits of skin to skin, hand expression/breast compressions with feeds to increase transfer, signs of nutritive sucking, and adequate feeding amounts. Recommended keeping infant skin to skin, hand expressing often, and watching educational videos. Ongoing lactation support offered. Understanding verbalized. Questions denied.     Instructed patient our lactation team is here for continued support throughout their breastfeeding journey. Our team has encouraged patient to call with any questions or concerns that may arise after discharge.    Breastfeeding and Diaper Chart  Check List for Essentials of Positioning And Latch-on handout provided by Lactation Education Resources  Hand Expression handout provided by Lactation Education Resources  Five Keys to Successful Breastfeeding handout provided by Lactation Education Resources    The Many Benefits if Breastfeeding handout given  Breastfeeding saves  time  *Breastfeeding allows you to calm or feed your baby immediately, which leads to a happier baby who cries less  *There is nothing to buy, prepare, or maintain.There is nothing to clean or sterilize.  Breastfeeding builds a mothers confidence  *She knows all her baby needs to thrive is her!  Breastfeeding saves Money  *There is no formula to buy and healthier breast fed babies have less medical costs  Healthy Mom/Healthy baby  * babies get sick less often, and when they do they are usually sick less severely and for a shorter time  * babies have fewer ear infections  * babies have fewer allergies  *Mothers who breastfeed have a lower risk for cancer, osteoporosis, anemia, high blood pressure, obesity, and Type ll diabetes  *Mothers miss less work days with sick babies  Breast fed babies have a better dental health  * babies have better jaw development which requires lest orthodontic work  *Breast milk does not promote cavities  * babies can nurse at night without worry of tooth decay  Breastfeeding allows a baby to reach his full IQ potential  *The longer a baby is breast fed, the better their brain development  Breast fed babies and moms are more relaxed  *The hormones released during breastfeeding have a calming effect on mothers  *Breastfeeding requires mom to take a break; this may help mom get more rest after delivery  *Breastfeeding is quicker than preparing formula which allows mom and baby to get back to sleep faster  *Breastfeeding promotes bonding and allows mom to learn babies cues and care needs more quickly  Breastfeeding cleanup is easier  *The bowel movements and spit up of breast fed babies doesn't smell as bad  *Spit-up of breast fed babies doesn't stain clothing  Getting out of the hourse is easier  *No formula bottles to prepare and carry safely   *No time restraints due to worry about what baby will eat  *No worries about warming a bottle or  finding safe water to prepare bottles  Breastfeeding mother get their bodies back sooner  *The uterus shrinks more quickly and completely, which allows a flatter tummy  *Breastfeeding burns 400-500 calories a day; making milk torches stored fat!  Breastfeeding is better for the environment  *There is no trash to dispose of after breastfeeding  *There is no production facility to produce breast milk; moms body does it all without the pollution of a factory    Your Guide to Breastfeeding Booklet by [employer] ? IO, www.Mapbox    Safe Storage of Breastmilk magnet: [employer]    Educational Breastfeeding Videos on   YouTube  (length of video in minutes)    Expressing the First Milk - Small Baby Series (7:19)  Hand Expression [facility] (7:34)  Attaching Your Baby at the Breast - Breastfeeding Series (10:26)  The Power of Pumping - [facility]   Maximizing Production [facility] (9:35)  Instructions for use Medela Symphony breastpump (English) (1:58)  Medela 2-Phase Expression (4:05)  Medela double pumping video (2:19)  Choosing your PersonalFit breast shield size (3:04)  We also recommend visiting [email or web address] for valuable education and videos on breastfeeding full term AND  infants. This is a great resource to begin learning about breastfeeding during pregnancy as well.                [facility]             [phone]

## 2024-05-29 NOTE — L&D DELIVERY NOTE
"[facility]  Vaginal Delivery Note   Review the Delivery Report for details.       Delivery     Delivery: Vaginal, Spontaneous     YOB: 2023    Time of Birth:  Gestational Age 1:57 PM   39w6d     Anesthesia: Epidural     Delivering clinician: Edilma Calabrese    Forceps?   No   Vacuum? No    Shoulder dystocia present: No        Delivery narrative: Patient pushed through 2 contractions to achieve  over first-degree midline perineal laceration.  Viable male infant delivered occiput anterior.  Loose nuchal cord x 1 noted.  Infant vigorous and allowed to transition on mom's abdomen.  Cord clamping delayed by 60 seconds.  Spontaneous placenta, grossly intact upon inspection.  Sponge and needle count confirmed with RN    Infant    Findings: male  infant     Infant observations: Weight: No birth weight on file.   Length:   in  Observations/Comments:        Apgars:   @ 1 minute /      @ 5 minutes   Infant Name:      Placenta, Cord, and Fluid    Placenta delivered  Spontaneous  at   2024  2:01 PM     Cord: 3 vessels  present.   Nuchal Cord?  yes; Number of nuchal loops present:  1    Cord blood obtained: Yes    Cord gases obtained:  Yes    Cord gas results: Venous:  No results found for: \"PHCVEN\", \"BECVEN\"    Arterial:  No results found for: \"PHCART\", \"BECART\"     Repair    Episiotomy: None     No    Lacerations: Yes  Laceration Information  Laceration Repaired?   Perineal:       Periurethral:       Labial:       Sulcus:       Vaginal:       Cervical:         Suture used for repair: 3-0 Vicryl  Laceration Length for 3rd or 4th degree lacerations: N/A   Estimated Blood Loss:  100 ml             Quantitative Blood Loss:                  Complications  none    Disposition  Mother to Mother Baby/Postpartum  in stable condition currently.  Baby to remains with mom  in stable condition currently.      Edilma Calabrese MD  24  14:13 CDT        "

## 2024-05-29 NOTE — PROGRESS NOTES
Divina France JERRICA Sanchez  : 1990  MRN: [id number]  CSN: [id number]    Labor progress note    Subjective   Patient currently reports is comfortable with the epidural     Objective   Min/max vitals past 24 hours:  Temp  Min: 97.8 °F (36.6 °C)  Max: 98.6 °F (37 °C)   BP  Min: 91/52  Max: 132/88   Pulse  Min: 73  Max: 113   Resp  Min: 17  Max: 18        FHT's: reactive, reassuring, although with low baseline which has been stable for many hours   Cervix: was not checked.   Contractions: irregular          Assessment   IUP at 39w6d  Elective IOL  GBS positive  Fetus reassuring     Plan   Continue with induction  Will plan AROM for later this afternoon    Edilma Calabrese MD  2024  10:25 CDT

## 2024-05-29 NOTE — ANESTHESIA PREPROCEDURE EVALUATION
Anesthesia Evaluation     Patient summary reviewed and Nursing notes reviewed   no history of anesthetic complications:                Airway   Mallampati: II  TM distance: >3 FB  No difficulty expected  Dental      Pulmonary - negative pulmonary ROS   Cardiovascular - negative cardio ROS        Neuro/Psych  (+) psychiatric history  GI/Hepatic/Renal/Endo - negative ROS     Musculoskeletal (-) negative ROS    Abdominal    Substance History - negative use     OB/GYN    (+) Pregnant        Other                    Anesthesia Plan    ASA 2     epidural       Anesthetic plan, risks, benefits, and alternatives have been provided, discussed and informed consent has been obtained with: patient.    Plan discussed with CRNA.    CODE STATUS:    Level Of Support Discussed With: Patient  Code Status (Patient has no pulse and is not breathing): CPR (Attempt to Resuscitate)  Medical Interventions (Patient has pulse or is breathing): Full Support

## 2024-05-30 LAB
BASOPHILS # BLD AUTO: 0.03 10*3/MM3 (ref 0–0.2)
BASOPHILS NFR BLD AUTO: 0.2 % (ref 0–1.5)
DEPRECATED RDW RBC AUTO: 47.6 FL (ref 37–54)
EOSINOPHIL # BLD AUTO: 0.17 10*3/MM3 (ref 0–0.4)
EOSINOPHIL NFR BLD AUTO: 1.1 % (ref 0.3–6.2)
ERYTHROCYTE [DISTWIDTH] IN BLOOD BY AUTOMATED COUNT: 15.8 % (ref 12.3–15.4)
HCT VFR BLD AUTO: 26.3 % (ref 34–46.6)
HGB BLD-MCNC: 7.7 G/DL (ref 12–15.9)
IMM GRANULOCYTES # BLD AUTO: 0.08 10*3/MM3 (ref 0–0.05)
IMM GRANULOCYTES NFR BLD AUTO: 0.5 % (ref 0–0.5)
LYMPHOCYTES # BLD AUTO: 2.19 10*3/MM3 (ref 0.7–3.1)
LYMPHOCYTES NFR BLD AUTO: 14.3 % (ref 19.6–45.3)
MCH RBC QN AUTO: 24.1 PG (ref 26.6–33)
MCHC RBC AUTO-ENTMCNC: 29.3 G/DL (ref 31.5–35.7)
MCV RBC AUTO: 82.2 FL (ref 79–97)
MONOCYTES # BLD AUTO: 0.6 10*3/MM3 (ref 0.1–0.9)
MONOCYTES NFR BLD AUTO: 3.9 % (ref 5–12)
NEUTROPHILS NFR BLD AUTO: 12.28 10*3/MM3 (ref 1.7–7)
NEUTROPHILS NFR BLD AUTO: 80 % (ref 42.7–76)
NRBC BLD AUTO-RTO: 0 /100 WBC (ref 0–0.2)
PLATELET # BLD AUTO: 361 10*3/MM3 (ref 140–450)
PMV BLD AUTO: 11 FL (ref 6–12)
RBC # BLD AUTO: 3.2 10*6/MM3 (ref 3.77–5.28)
WBC NRBC COR # BLD AUTO: 15.35 10*3/MM3 (ref 3.4–10.8)

## 2024-05-30 PROCEDURE — 0503F POSTPARTUM CARE VISIT: CPT | Performed by: OBSTETRICS & GYNECOLOGY

## 2024-05-30 PROCEDURE — 85025 COMPLETE CBC W/AUTO DIFF WBC: CPT | Performed by: OBSTETRICS & GYNECOLOGY

## 2024-05-30 RX ORDER — TRAMADOL HYDROCHLORIDE 50 MG/1
50 TABLET ORAL EVERY 6 HOURS PRN
Status: DISCONTINUED | OUTPATIENT
Start: 2024-05-30 | End: 2024-05-31 | Stop reason: HOSPADM

## 2024-05-30 RX ADMIN — TRAMADOL HYDROCHLORIDE 50 MG: 50 TABLET ORAL at 23:06

## 2024-05-30 RX ADMIN — DOCUSATE SODIUM 100 MG: 100 CAPSULE, LIQUID FILLED ORAL at 10:10

## 2024-05-30 RX ADMIN — IBUPROFEN 600 MG: 600 TABLET, FILM COATED ORAL at 05:35

## 2024-05-30 RX ADMIN — DOCUSATE SODIUM 100 MG: 100 CAPSULE, LIQUID FILLED ORAL at 21:04

## 2024-05-30 RX ADMIN — ACETAMINOPHEN 650 MG: 325 TABLET, FILM COATED ORAL at 10:14

## 2024-05-30 NOTE — PLAN OF CARE
Goal Outcome Evaluation:              Outcome Evaluation: VSS. FFMLU1, scant lochia. voiding and ambulating. breastfeeding with a nipple shield. pt anxious and overwhelmed, given encouragement. bonding well with infant. need paternity.

## 2024-05-30 NOTE — PAYOR COMM NOTE
"ADMIT 5/28/2024  MATERNITY    [facility]  NEGRA, CM  [phone]  OR  FAX   [phone]        Edilma Sanchez JERRICA (34 y.o. Female)       Date of Birth   1990    Social Security Number       Address   [address] [facility] [place] [address]    Home Phone   [phone]    MRN   [id number]       Judaism   Other    Marital Status   Single                            Admission Date   5/28/24    Admission Type   Elective    Admitting Provider   Edilma Calabrese MD    Attending Provider   Edilma Calabrese MD    Department, Room/Bed   [facility] MOTHER BABY 2A, M237/1       Discharge Date       Discharge Disposition       Discharge Destination                                 Attending Provider: Edilma Calabrese MD    Allergies: Bactrim [Sulfamethoxazole-trimethoprim], Penicillins    Isolation: None   Infection: None   Code Status: CPR    Ht: 165.1 cm (65\")   Wt: 85.7 kg (189 lb)    Admission Cmt: None   Principal Problem: Pregnancy [Z34.90]                   Active Insurance as of 5/28/2024       Primary Coverage       Payor Plan Insurance Group Employer/Plan Group    [employer] [employer] EM[employer]EE [id number]       Payor Plan Address Payor Plan Phone Number Payor Plan Fax Number Effective Dates    [id number] [phone]  1/1/2022 - None Entered    [facility] [id number]         Subscriber Name Subscriber Birth Date Member ID       EDLIMA SANCHEZ R 1990 [id number]               Secondary Coverage       Payor Plan Insurance Group Employer/Plan Group    [facility] MEDICAID        Payor Plan Address Payor Plan Phone Number Payor Plan Fax Number Effective Dates    [address] [address] [phone]  11/29/2023 - None Entered    [facility] [id number]         Subscriber Name Subscriber Birth Date Member ID       EDILMA SANCHEZ R 1990 [id number]                     Emergency Contacts        (Rel.) Home Phone Work Phone Mobile Phone    Danielanastasia (Mother) [phone] " "-- --     EDC 24   G-2 P-1  39/6 GESTATION         Problem List             Codes Noted - Resolved       Non-Hospital    Rh Matthisa Falk [[id number]]    Labor Events     labor?: No   steroids?: None  Antibiotics during labor?: Yes  Rupture date/time: 2024 0950  Rupture type: spontaneous rupture of membranes  Fluid color: normal  Fluid odor: None  Labor type: Induced Onset of Labor  Labor allowed to proceed with plans for an attempted vaginal birth?: Yes  Induction: Oxytocin  Induction date/time: 2024 1718  Induction indications: Elective  Complications: None  Presentation    Presentation: Vertex  Position: Occiput Anterior  [place] Delivery    Head delivery date/time: 2024 13:57:00  Delivery date/time: 2024  1:57 PM   Delivery type: Vaginal, Spontaneous   Details: Trial of labor?: Yes        Operative Delivery    Forceps attempted?: No  Vacuum extractor attempted?: No                   Assessment    Living status: Living  Infant family band number: [id number]  Apgars   1 Minute: 5 Minute: 10 Minute 15 Minute 20 Minute   Skin Color: 0 1      Heart Rate: 2 2      Reflex Irritability: 2 2      Muscle Tone: 2 2      Respiratory Effort: 2 2      Total: 8 9              Apgars Assigned By: SRIRAM BRADLEY  Resuscitation    Method: Tactile Stimulation, Dried  Suction Details  Suction method: bulb syringe  Airway suction: mouth, nose  Oxygen Details  Skin to Skin    Skin to skin initiated date/time: 2024 1415  Skin to skin with: Mother  Measurements    Weight: 5 lb 14.5 oz 2680 g Length: 20.5\"   Birth comments: hc: 33.5CM  Delivery Information    Episiotomy: None  Perineal lacerations: 1st Repaired: Yes   Surgical or additional est. blood loss (mL): 0  Combined est. blood loss (mL): 0gative, antepartum ICD-10-CM: O26.899, Z67.91  ICD-9-CM: 646.83 3/13/2024 - Present    Fetal cardiac echogenic focus, antepartum ICD-10-CM: O35.BXX0  ICD-9-CM: 655.83 2024 - " "Present        Operative/Procedure Notes (last 72 hours)        Edilma Calabrese MD at 24 1413          [facility]  Vaginal Delivery Note   Review the Delivery Report for details.       Delivery     Delivery: Vaginal, Spontaneous     YOB: 2023    Time of Birth:  Gestational Age 1:57 PM   39w6d     Anesthesia: Epidural     Delivering clinician: Edilma Calabrese    Forceps?   No   Vacuum? No    Shoulder dystocia present: No        Delivery narrative: Patient pushed through 2 contractions to achieve  over first-degree midline perineal laceration.  Viable male infant delivered occiput anterior.  Loose nuchal cord x 1 noted.  Infant vigorous and allowed to transition on mom's abdomen.  Cord clamping delayed by 60 seconds.  Spontaneous placenta, grossly intact upon inspection.  Sponge and needle count confirmed with RN    Infant    Findings: male  infant     Infant observations: Weight: No birth weight on file.   Length:   in  Observations/Comments:        Apgars:   @ 1 minute /      @ 5 minutes   Infant Name:      Placenta, Cord, and Fluid    Placenta delivered  Spontaneous  at   2024  2:01 PM     Cord: 3 vessels  present.   Nuchal Cord?  yes; Number of nuchal loops present:  1    Cord blood obtained: Yes    Cord gases obtained:  Yes    Cord gas results: Venous:  No results found for: \"PHCVEN\", \"BECVEN\"    Arterial:  No results found for: \"PHCART\", \"BECART\"     Repair    Episiotomy: None     No    Lacerations: Yes  Laceration Information  Laceration Repaired?   Perineal:       Periurethral:       Labial:       Sulcus:       Vaginal:       Cervical:         Suture used for repair: 3-0 Vicryl  Laceration Length for 3rd or 4th degree lacerations: N/A   Estimated Blood Loss:  100 ml             Quantitative Blood Loss:                  Complications  none    Disposition  Mother to Mother Baby/Postpartum  in stable condition currently.  Baby to remains with mom  in stable condition " currently.      Edilma Calabrese MD  05/29/24  14:13 CDT          Electronically signed by Edilma Calabrese MD at 05/29/24 1451

## 2024-05-30 NOTE — PLAN OF CARE
Goal Outcome Evaluation:              Outcome Evaluation: VSS. FFU2Ml, scant lochia. No clots. No odor. Pt is breastfeeding and  bonding well with infant. Pt is ambulating and voiding well without difficulty.

## 2024-05-30 NOTE — LACTATION NOTE
Visit in room to deliver breastpump from [facility]. Patient holding infant skin to skin. Patient states she had been trying to wake infant to breastfeed for 20 minutes now. States she is frustrated. Listened and offered assistance/support. Discussed waking techniques, hand expressing, feeding amounts, and pumping. Patient declines assistance at this time. Recommended placing infant in crib and resting. Attempt feeding again in 30 minutes to 1 hour. Offered support as desired. Patient appreciative. Questions denied.

## 2024-05-30 NOTE — ANESTHESIA POSTPROCEDURE EVALUATION
Patient: Edilma Sanchez    Procedure Summary       Date: 05/29/24 Room / Location:     Anesthesia Start: 0640 Anesthesia Stop: 1357    Procedure: LABOR ANALGESIA Diagnosis:     Scheduled Providers:  Provider: Michael Ulloa CRNA    Anesthesia Type: epidural ASA Status: 2            Anesthesia Type: epidural    Vitals  Vitals Value Taken Time   /85 05/30/24 0800   Temp 98 °F (36.7 °C) 05/30/24 0800   Pulse 92 05/30/24 0800   Resp 18 05/30/24 0800   SpO2 99 % 05/30/24 0800           Post Anesthesia Care and Evaluation    Patient location during evaluation: floor  Patient participation: complete - patient participated  Level of consciousness: awake and alert  Pain management: adequate    Airway patency: patent  Anesthetic complications: No anesthetic complications  PONV Status: none  Cardiovascular status: acceptable and stable  Respiratory status: acceptable and unassisted  Hydration status: acceptable

## 2024-05-30 NOTE — PROGRESS NOTES
"      Naun Farias MD  [facility]  [address] [facility] [address] [place] [address]  Office [phone]  Fax [phone]    [facility]  Vaginal Delivery Progress Note    Subjective   Postpartum Day 1: Vaginal Delivery    The patient feels well.  Her pain is well controlled with nonsteroidal anti-inflammatory drugs and Tylenol.   She is ambulating well.  Patient describes her bleeding as thin lochia.    Breastfeeding: infant latching without difficulty without pain.    Objective     Vital Signs Range for the last 24 hours  Temperature: Temp:  [97.9 °F (36.6 °C)-98.6 °F (37 °C)] 98 °F (36.7 °C)   Temp Source: Temp src: Oral   BP: BP: ()/() 101/85   Pulse: Heart Rate:  [] 92   Respirations: Resp:  [16-20] 18   SPO2: SpO2:  [97 %-100 %] 99 %   O2 Amount (l/min):     O2 Devices Device (Oxygen Therapy): room air   Weight:       Admit Height:  Height: 165.1 cm (65\")      Physical Exam:  General:  no acute distresss.  Abdomen: abdomen is soft without significant tenderness, masses, organomegaly or guarding. Fundus: appropriate, firm, non tender  Extremities: normal, atraumatic, no cyanosis, and trace edema.       Lab results reviewed:  Yes   Rubella:  No results found for: \"RUBELLAIGGIN\" Nurse Transcribed from prenatal record --  No components found for: \"EXTRUBELQUAL\"  Rh Status:    RH type   Date Value Ref Range Status   05/28/2024 Negative  Final     Immunizations:   Immunization History   Administered Date(s) Administered    DTP 05/07/1996    HPV Quadrivalent 06/22/2009, 03/04/2010    Hep B, Adolescent or Pediatric 11/07/2001, 12/07/2001, 05/15/2002    MCV4 Unspecified 03/22/2006    MMR 05/07/1996    OPV 05/07/1996    Rho (D) Immune Globulin 03/13/2024    Tdap 03/22/2006, 04/04/2024     Lab Results (last 24 hours)       Procedure Component Value Units Date/Time    CBC & Differential [[id number]]  (Abnormal) Collected: 05/30/24 0550    Specimen: Blood Updated: 05/30/24 0637    Narrative:      The " following orders were created for panel order CBC & Differential.  Procedure                               Abnormality         Status                     ---------                               -----------         ------                     CBC Auto Differential[[id number]]        Abnormal            Final result                 Please view results for these tests on the individual orders.    CBC Auto Differential [[id number]]  (Abnormal) Collected: 05/30/24 0550    Specimen: Blood Updated: 05/30/24 0637     WBC 15.35 10*3/mm3      RBC 3.20 10*6/mm3      Hemoglobin 7.7 g/dL      Hematocrit 26.3 %      MCV 82.2 fL      MCH 24.1 pg      MCHC 29.3 g/dL      RDW 15.8 %      RDW-SD 47.6 fl      MPV 11.0 fL      Platelets 361 10*3/mm3      Neutrophil % 80.0 %      Lymphocyte % 14.3 %      Monocyte % 3.9 %      Eosinophil % 1.1 %      Basophil % 0.2 %      Immature Grans % 0.5 %      Neutrophils, Absolute 12.28 10*3/mm3      Lymphocytes, Absolute 2.19 10*3/mm3      Monocytes, Absolute 0.60 10*3/mm3      Eosinophils, Absolute 0.17 10*3/mm3      Basophils, Absolute 0.03 10*3/mm3      Immature Grans, Absolute 0.08 10*3/mm3      nRBC 0.0 /100 WBC     Tissue Pathology Exam [[id number]] Collected: 05/29/24 1406    Specimen: Tissue from Placenta Updated: 05/29/24 1608            External Prenatal Results       Pregnancy Outside Results - Transcribed From Office Records - See Scanned Records For Details       Test Value Date Time    ABO  O  05/28/24 1632    Rh  Negative  05/28/24 1632    Antibody Screen  Negative  05/28/24 1632       Negative  03/13/24 1443       Negative  11/01/23 1430    Varicella IgG  2,632 index 11/01/23 1430    Rubella  25.40 index 11/01/23 1430    Hgb  7.7 g/dL 05/30/24 0550       9.0 g/dL 05/28/24 1632       10.4 g/dL 03/13/24 1443       13.4 g/dL 11/01/23 1430    Hct  26.3 % 05/30/24 0550       28.9 % 05/28/24 1632       40.9 % 11/01/23 1430    Glucose Fasting GTT       Glucose Tolerance Test 1 hour        Glucose Tolerance Test 3 hour       Gonorrhea (discrete)  Negative  05/09/24 1533       Negative  02/21/24 1500       Negative  11/01/23 1430    Chlamydia (discrete)  Negative  05/09/24 1533       Negative  02/21/24 1500       Negative  11/01/23 1430    RPR  Non Reactive  03/13/24 1443       Non Reactive  11/01/23 1430    VDRL       Syphilis Antibody       HBsAg  Negative  11/01/23 1430    Herpes Simplex Virus PCR       Herpes Simplex VIrus Culture       HIV  Non Reactive  11/01/23 1430    Hep C RNA Quant PCR       Hep C Antibody       AFP       Group B Strep  Positive  05/09/24 1533    GBS Susceptibility to Clindamycin       GBS Susceptibility to Erythromycin       Fetal Fibronectin       Genetic Testing, Maternal Blood                 Drug Screening       Test Value Date Time    NIPT        Urine Drug Screen       Amphetamine Screen       Barbiturate Screen       Benzodiazepine Screen       Methadone Screen       Phencyclidine Screen       Opiates Screen       THC Screen       Cocaine Screen       Propoxyphene Screen       Buprenorphine Screen       Methamphetamine Screen       Oxycodone Screen                 Legend    ^: Historical                            Assessment & Plan       * No active hospital problems. *      Edilma Sanchez is Day 1  post-partum  Vaginal, Spontaneous   .      Plan:  Continue current care.      Naun Farias MD  5/30/2024  09:04 CDT

## 2024-05-30 NOTE — LACTATION NOTE
Mother's Name: Edilma Sanchez  Phone #: [phone]  Infant Name: Floyd Seymour  : 24  Gestation: 39w6d  Day of life: 1  Birth weight: 5-14.5 (2680g) SGA Discharge weight:  Weight Loss: -0.37%  24 hour Summary of Feeds: 6BF Voids: 1 Stools:  3  Assistive devices (shields, shells, etc):  Significant Maternal history: , Depression, 1st time breastfeeder  Maternal Concerns: None at this time   Maternal Goal: Breastfeed-did not breastfeed first child  Mother's Medications: PNV, FE, Pepcid  Breastpump for home: [facility] has Rx per patient. Notified [facility] of delivery.  Ped follow up appt: LAYLA Rondon    Reviewed signs of a good feeding and infant getting enough. Discussed the importance of positioning and deep latch for comfort of nipples. Patient states feedings have been difficult and is tearful. Infant due to for a feeding. Assisted with waking infant while patient began viewing hand expression video and breast massage. Infant sleepy and reluctant to drop jaw for wide open gape. Hand expressed multiple large drops of colostrum to infant's mouth while waking and attempting deep latch. Shallow latch obtained a couple times and hand expressed milk to infant's mouth. Infant began licking and smacking. Moved infant to patient's lap to do sit ups to stimulate infant to wakeful state and rooting. Returned to football hold and infant latched deeply and began actively nursing. Patient took over and continued hand expressing while infant nursing with improved comfort of latch. Offered support, encouragement, and praise.     Instructed patient our lactation team is here for continued support throughout their breastfeeding journey. Our team has encouraged patient to call with any questions or concerns that may arise after discharge.

## 2024-05-30 NOTE — LACTATION NOTE
Mother's Name: Edilma Sanchez  Phone #: [phone]  Infant Name: Floyd Seymour  : 24  Gestation: 39w6d  Day of life:   Birth weight: 5-14.5 (2680g) SGA Discharge weight:  Weight Loss:   24 hour Summary of Feeds:  Voids:  Stools:  Assistive devices (shields, shells, etc):  Significant Maternal history: , Depression  Maternal Concerns: None at this time   Maternal Goal: Breastfeed-did not breastfeed first child  Mother's Medications: PNV, FE, Pepcid  Breastpump for home: [facility] has Rx per patient. Notified [facility] of delivery.  Ped follow up appt: LAYLA Rondon    Asked to see patient per nursing for difficulty latching.  Mom holding infant in her lap in cradle position when I arrived.  Patient was tearful and stated she didn't know why she was crying, but did feel like she was failing at breastfeeding.  Explained to patient that infants are normally sleepy after the initial alert state after delivery.  Educated and demonstrated different latching position such as football and cross cradle.  Mom unable to express any milk from left breast.  With permission I demonstrated and explained how to hand express.  Large drops of milk expressed from left breast and right breast has milk in nipple shield.  Attempted to latch infant in football position on right breast and infant latch easily.  Became sleepy after about 5 minutes and released breast.  Then demonstrated cross cradle position on left breast without shield.  Mom able to latch independently and hand express large drops of colostrum while attempting to latch.  Mom praised on doing a good job.  She remained tearful, but seemed pleased that she was able to latch and give drops of colostrum.  Encouraged to give drops of colostrum frequently to help reserve blood sugar and calories.    [id number]  Asked to help with feeding per nursing staff.  Assisted mom with positioning and latching.  Infant eager with hands to mouth, rooting on breast.  Attempted to  latch without nipple shield, but unable to get infant to gape mouth wide enough.  NS placed on and infant latched with a deep latch and active sucking.  Some stimulation required at end of feed to keep infant active.  After burping infant she latched him in cross cradle position on right breast and nursed for 15 minutes before falling asleep and releasing nipple.  Mom praised for good feeding.  Infant much more alert and actively rooting and sucking with this feeding.

## 2024-05-31 VITALS
BODY MASS INDEX: 31.49 KG/M2 | WEIGHT: 189 LBS | TEMPERATURE: 98.3 F | OXYGEN SATURATION: 97 % | RESPIRATION RATE: 16 BRPM | SYSTOLIC BLOOD PRESSURE: 124 MMHG | HEIGHT: 65 IN | HEART RATE: 96 BPM | DIASTOLIC BLOOD PRESSURE: 78 MMHG

## 2024-05-31 PROBLEM — O35.BXX0 FETAL CARDIAC ECHOGENIC FOCUS, ANTEPARTUM: Status: RESOLVED | Noted: 2024-01-09 | Resolved: 2024-05-31

## 2024-05-31 PROBLEM — Z67.91 RH NEGATIVE, ANTEPARTUM: Status: RESOLVED | Noted: 2024-03-13 | Resolved: 2024-05-31

## 2024-05-31 PROBLEM — O26.899 RH NEGATIVE, ANTEPARTUM: Status: RESOLVED | Noted: 2024-03-13 | Resolved: 2024-05-31

## 2024-05-31 PROCEDURE — 0503F POSTPARTUM CARE VISIT: CPT | Performed by: ADVANCED PRACTICE MIDWIFE

## 2024-05-31 RX ORDER — ACETAMINOPHEN 325 MG/1
650 TABLET ORAL EVERY 4 HOURS PRN
Start: 2024-05-31

## 2024-05-31 RX ORDER — IBUPROFEN 200 MG
400 TABLET ORAL EVERY 4 HOURS PRN
Start: 2024-05-31

## 2024-05-31 RX ADMIN — IBUPROFEN 600 MG: 600 TABLET, FILM COATED ORAL at 02:36

## 2024-05-31 RX ADMIN — IBUPROFEN 600 MG: 600 TABLET, FILM COATED ORAL at 09:22

## 2024-05-31 RX ADMIN — DOCUSATE SODIUM 100 MG: 100 CAPSULE, LIQUID FILLED ORAL at 09:22

## 2024-05-31 NOTE — DISCHARGE SUMMARY
[facility] – [place] [place] [facility]    Ela Burton, APRN  [address]  [place] [address]  [phone]      Discharge Summary    [facility]  Edilma Sanchez  : 1990  MRN: [id number]  CSN: [id number]    Date of Admission: 2024   Date of Discharge:  2024   Delivering Physician: Edilma Calabrese        Admission Diagnosis: Pregnancy [Z34.90]   Discharge Diagnosis: Pregnancy at 39w6d - delivered       Procedures: 2024  - Vaginal, Spontaneous       Hospital Course  Patient is a 34 y.o.  who at 39w6d had a vaginal birth.  Her postpartum course was without complications.  On PPD #2 she was ready for discharge.  She had normal lochia and pain well controlled with oral medications.    Infant  male  fetus weighing 2680 g (5 lb 14.5 oz)   Apgars -  8 @ 1 minute /  9 @ 5 minutes.    Discharge labs  Lab Results   Component Value Date    WBC 15.35 (H) 2024    HGB 7.7 (L) 2024    HCT 26.3 (L) 2024     2024       Discharge Medications     Discharge Medications        New Medications        Instructions Start Date   acetaminophen 325 MG tablet  Commonly known as: TYLENOL   650 mg, Oral, Every 4 Hours PRN      ibuprofen 200 MG tablet  Commonly known as: ADVIL,MOTRIN   400 mg, Oral, Every 4 Hours PRN             Continue These Medications        Instructions Start Date   Breast Pump misc   1 each, Does not apply, As Needed      famotidine 20 MG tablet  Commonly known as: Pepcid   20 mg, Oral, Daily      ferrous sulfate 324 MG tablet delayed-release   324 mg, Oral, Every Other Day, With a source of Vitamin C.      prenatal (CLASSIC) vitamin 28-0.8 MG tablet tablet  Generic drug: prenatal vitamin   Oral, Daily               External Prenatal Results       Pregnancy Outside Results - Transcribed From Office Records - See Scanned Records For Details       Test Value Date Time    ABO  O  24 1632    Rh  Negative  24 1632    Antibody Screen  Negative  24  1632       Negative  03/13/24 1443       Negative  11/01/23 1430    Varicella IgG  2,632 index 11/01/23 1430    Rubella  25.40 index 11/01/23 1430    Hgb  7.7 g/dL 05/30/24 0550       9.0 g/dL 05/28/24 1632       10.4 g/dL 03/13/24 1443       13.4 g/dL 11/01/23 1430    Hct  26.3 % 05/30/24 0550       28.9 % 05/28/24 1632       40.9 % 11/01/23 1430    Glucose Fasting GTT       Glucose Tolerance Test 1 hour       Glucose Tolerance Test 3 hour       Gonorrhea (discrete)  Negative  05/09/24 1533       Negative  02/21/24 1500       Negative  11/01/23 1430    Chlamydia (discrete)  Negative  05/09/24 1533       Negative  02/21/24 1500       Negative  11/01/23 1430    RPR  Non Reactive  03/13/24 1443       Non Reactive  11/01/23 1430    VDRL       Syphilis Antibody       HBsAg  Negative  11/01/23 1430    Herpes Simplex Virus PCR       Herpes Simplex VIrus Culture       HIV  Non Reactive  11/01/23 1430    Hep C RNA Quant PCR       Hep C Antibody       AFP       Group B Strep  Positive  05/09/24 1533    GBS Susceptibility to Clindamycin       GBS Susceptibility to Erythromycin       Fetal Fibronectin       Genetic Testing, Maternal Blood                 Drug Screening       Test Value Date Time    NIPT        Urine Drug Screen       Amphetamine Screen       Barbiturate Screen       Benzodiazepine Screen       Methadone Screen       Phencyclidine Screen       Opiates Screen       THC Screen       Cocaine Screen       Propoxyphene Screen       Buprenorphine Screen       Methamphetamine Screen       Oxycodone Screen                 Legend    ^: Historical                            Discharge Disposition Home or Self Care   Condition on Discharge: good   Follow-up: 1-2 weeks with RAMESH Burton for mood check       Plan for discharge reviewed with Dr. Farias.    This note has been signed electronically.    Ela Burton, DNP, APRN, CNM, RNC-OB  5/31/2024

## 2024-05-31 NOTE — PROGRESS NOTES
"      LAYLA Zavaleta  [facility]  [address]  [place] [address]  Office [phone]  Fax [phone]    [facility]  Vaginal Delivery Progress Note    Subjective   Postpartum Day 2: Vaginal Delivery    The patient feels well but a \"little emotional today\".  Her pain is well controlled with Tylenol, Motrin, and comfort products.   She is ambulating well.  Patient describes her bleeding as thin lochia.    Breastfeeding: infant latching with difficulty.    Objective     Vital Signs Range for the last 24 hours  Temperature: Temp:  [97.9 °F (36.6 °C)-98.3 °F (36.8 °C)] 98.3 °F (36.8 °C)   Temp Source: Temp src: Oral   BP: BP: (121-124)/(78-81) 124/78   Pulse: Heart Rate:  [96-98] 96   Respirations: Resp:  [16-18] 16   SPO2: SpO2:  [97 %-98 %] 97 %   O2 Amount (l/min):     O2 Devices Device (Oxygen Therapy): room air   Weight:       Admit Height:  Height: 165.1 cm (65\")      Physical Exam:  General:  no acute distresss.  Abdomen: abdomen is soft without significant tenderness, masses, organomegaly or guarding. Fundus: appropriate, firm, non tender  Extremities: normal, atraumatic, no cyanosis, and trace edema.       Lab results reviewed:  Yes   Rubella:  No results found for: \"RUBELLAIGGIN\" Nurse Transcribed from prenatal record --  No components found for: \"EXTRUBELQUAL\"  Rh Status:    RH type   Date Value Ref Range Status   05/28/2024 Negative  Final     Immunizations:   Immunization History   Administered Date(s) Administered    DTP 05/07/1996    HPV Quadrivalent 06/22/2009, 03/04/2010    Hep B, Adolescent or Pediatric 11/07/2001, 12/07/2001, 05/15/2002    MCV4 Unspecified 03/22/2006    MMR 05/07/1996    OPV 05/07/1996    Rho (D) Immune Globulin 03/13/2024    Tdap 03/22/2006, 04/04/2024     Lab Results (last 24 hours)       ** No results found for the last 24 hours. **            External Prenatal Results       Pregnancy Outside Results - Transcribed From Office Records - See Scanned Records For " Details       Test Value Date Time    ABO  O  05/28/24 1632    Rh  Negative  05/28/24 1632    Antibody Screen  Negative  05/28/24 1632       Negative  03/13/24 1443       Negative  11/01/23 1430    Varicella IgG  2,632 index 11/01/23 1430    Rubella  25.40 index 11/01/23 1430    Hgb  7.7 g/dL 05/30/24 0550       9.0 g/dL 05/28/24 1632       10.4 g/dL 03/13/24 1443       13.4 g/dL 11/01/23 1430    Hct  26.3 % 05/30/24 0550       28.9 % 05/28/24 1632       40.9 % 11/01/23 1430    Glucose Fasting GTT       Glucose Tolerance Test 1 hour       Glucose Tolerance Test 3 hour       Gonorrhea (discrete)  Negative  05/09/24 1533       Negative  02/21/24 1500       Negative  11/01/23 1430    Chlamydia (discrete)  Negative  05/09/24 1533       Negative  02/21/24 1500       Negative  11/01/23 1430    RPR  Non Reactive  03/13/24 1443       Non Reactive  11/01/23 1430    VDRL       Syphilis Antibody       HBsAg  Negative  11/01/23 1430    Herpes Simplex Virus PCR       Herpes Simplex VIrus Culture       HIV  Non Reactive  11/01/23 1430    Hep C RNA Quant PCR       Hep C Antibody       AFP       Group B Strep  Positive  05/09/24 1533    GBS Susceptibility to Clindamycin       GBS Susceptibility to Erythromycin       Fetal Fibronectin       Genetic Testing, Maternal Blood                 Drug Screening       Test Value Date Time    NIPT        Urine Drug Screen       Amphetamine Screen       Barbiturate Screen       Benzodiazepine Screen       Methadone Screen       Phencyclidine Screen       Opiates Screen       THC Screen       Cocaine Screen       Propoxyphene Screen       Buprenorphine Screen       Methamphetamine Screen       Oxycodone Screen                 Legend    ^: Historical                            Assessment & Plan       * No active hospital problems. *      Edilma Sanchez is Day 2  post-partum  Vaginal, Spontaneous   .      Plan:  Continue with current postpartum plan of care. Discharge instructions provided,  including reasons to return to the hospital or call the office. Lactation support as needed. Return to the clinic in 1-2 weeks for mood check.     Plan for discharge reviewed with Dr. Farias.     This note has been signed electronically.    Ela Burton DNP, APRN, CNM, RNC-OB  5/31/2024  09:43 CDT

## 2024-05-31 NOTE — PLAN OF CARE
Goal Outcome Evaluation:      VSS. Voiding and ambulating. FF, ML, U1, scant lochia. Prn pain meds given. Breastfeeding and bonding well with baby. Significant other at bedside.

## 2024-05-31 NOTE — CASE MANAGEMENT/SOCIAL WORK
Order: EPDS of a 10. Spoke with pt to assess situation, father of baby in room as well as patients 11 year old daughter.  She admits being in counseling less than a year ago, encouraged to go back if needed.  Pt saying she was on meds for depression before pregnancy, plans to get back on these. Encouraged her to reach out to physician if depression does not improve or worsens.  Pt lives with 11 year old dtr and now , father of baby plans to stay with them to assist for awhile.  They deny any other needs. Please inform if questions/concerns arise.

## 2024-05-31 NOTE — LACTATION NOTE
Mother's Name: Edilma Sanchez  Phone #: [phone]  Infant Name: Floyd Seymour  : 24  Gestation: 39w6d  Day of life: 2  Birth weight: 5-14.5 (2680g) SGA Discharge weight: 5-13.5 (2650g)  Weight Loss: -1.12%  24 hour Summary of Feeds: 7BF Voids: 4 Stools:  4  Assistive devices (shields, shells, etc):  Significant Maternal history: , Depression, 1st time breastfeeder  Maternal Concerns: None at this time   Maternal Goal: Breastfeed-did not breastfeed first child  Mother's Medications: PNV, FE, Pepcid  Breastpump for home: Spectra  Ped follow up appt: LAYLA Rondon on , PRN Lactation    Assisted with attempting to breastfeed on 2nd breast. Patient reports leaking of breastmilk. Discussed signs of milk transitioning, infant getting enough, and discharge packet provided. Discussed the need to pump for comfort if she becomes uncomfortable with pressure in her breasts even after feeding infant. Discussed signs of intolerance of flow from infant and interventions to help infant. Infant smacking but reluctant to open wide for deep latch. Assisted with positioning and latching and was able to get infant latched but had to stimulate for suck and infant still fell asleep. Recommended hand expressing drops and allowing infant to rest before waking and attempting left breast in 30 minutes or immediately after circumcision. Discussed common difficulties after circ and encouraged skin to skin and hand expression as much as possible. Offered continued support and encouragement.     Instructed patient our lactation team is here for continued support throughout their breastfeeding journey. Our team has encouraged patient to call with any questions or concerns that may arise after discharge.      Signs of Milk: Fullness, firmness, heaviness of breasts, leaking of milk.  Signs of Good Feed: Breast fullness prior to feed, breasts soft and comfortable after feeding. Infant content after feeding: calm, sleepy,  relaxed and without continued hunger cues.  Signs of Plugged Ducts, Engorgement and Mastitis: Plugged ducts (milk entrapment in milk ducts)- small tender knots that often feel like little beans under breast tissue, usually tender. Massage on these areas of concern while breastfeeding or pumping to promote emptying.   Engorgement- fluid or excess milk, breasts become uncomfortably full, tight, firm (compare to the firmness of your cheek (mild), chin (moderate) or forehead (severe). First line of treatment should be to BREASTFEED, if breasts remain full feeling after a feeding, it may be necessary to pump, ONLY UNTIL BREASTS ARE SOFT AND COMFORTABLE. DO NOT OVER PUMP (complete emptying of breasts can trigger even more milk which will cause continued, recurrent Engorgement).  Mastitis- Infection of the breast tissue, most often caused by plugged ducts that are not adequately treated by emptying, recurrent trauma to nipples or breasts (cracked or bleeding nipples). Signs: redness, swelling, tender knots or fever to breasts as well as generalized fever >101 degrees F that is often sudden onset. Treatment of mastitis, BREASTFEED! Pump after breastfeeding to achieve COMPLETE emptying of affected breast, utilizing massage to areas of concern, may use cold compress to affected area only after breast emptying. May take anti-inflammatories i.e. Ibuprofen, Motrin. CALL your OB for assessment and continued treatment with Antibiotics to adequately treat mastitis.  Infant Care: Over the first 2 weeks it is important to keep record of infant's feeding routine (feeding times and durations), wet and dirty diaper frequency, stool color and any spit ups that may occur.  Keep in mind, ALL babies will lose some weight initially (usually no more than 10% by day 3). Until infant returns to/ surpasses birth weight (which can take up to 2 weeks), it is important to offer feedings AT LEAST EVERY 3 HOURS. Remember, if you choose to supplement  infant with formula or previously pumped milk, you should always pump in replacement of that feeding in order to promote and maintain a healthy milk supply!  Maternal Care: REST, sleep when the infant sleeps, stay hydrated (water is optimal) drink to thirst, increase caloric intake - breastfeeding mother's need an ADDITIONAL 500 calories per day , eat 3 meals/day as well as snacks in between, limit CAFFIENE intake to 2 cups/day. Ask your significant other, family members or friends for help when needed, taking advantage of meal trains, allowing others to help with laundry, house chores, etc can help you focus on what is most important early on after delivery… you and your infant, and breastfeeding!   Medications to CONTINUE: Prenatal Vitamins are important to continue taking while breastfeeding. Fish oil, magnesium/calcium supplements often are helpful to support Mothers and their milk supply as well. Tylenol, Ibuprofen, regular Zyrtec, Claritin are SAFE if you suffer from seasonal allergies. Flonase is safe and often an effective medication to take if suffering from sinus drainage/pressure.  Medications to AVOID: Benadryl, Sudafed, any medications including “DM” or have a drying effect to sinus drainage will also dry a mother's milk up. Birth control- your OB will want to address birth control options with you usually around 4-6 weeks postpartum, be sure to notify your MD if you continue to breastfeed as some birth controls may significantly decrease your milk supply. Herbals- some herbs may also decrease your milk supply: PEPPERMINT, MENTHOL in any form (candies, essential oils, teas, etc), so check labels and avoid using in excess.  Pumping: Although we encourage you to focus on breastfeeding over the first 2-4 weeks, you will need to plan to begin pumping. We do recommend implementing pumping by the time infant is 4 weeks old. Pump 2-3 times per day immediately AFTER breastfeeding, it is normal to collect very  small amounts initially, but the more consistently you pump, the more you will begin to collect. Store collected milk in refrigerator or freezer. You should also begin offering infant a bottle around 4 weeks. Remember to use slow flow nipples and PACE the bottle-feed. A bottle feed should take about as long as a breastfeeding session.

## 2024-05-31 NOTE — NURSING NOTE
This patient attended the Discharge Teaching Class during their stay. Information was taught out of the Postpartum and [place] Care booklet. Topics included in the discharge class are:    - Uterine and lochia changes  - Possible Postpartum Complications  - Perineal and Incisional care including Pelvic Rest  - Postpartum Depression and Anxiety  - Importance of notifying MD if any complications or concerns arise.     Additional topics included in the class related to  Care are:    - Caring for a  including use of bulb syringe  - Changes in  behavior  - Safe sleep and safety precautions  - Shaken Baby Syndrome  - Importance of notifying MD if concerns or complications arise.

## 2024-05-31 NOTE — PLAN OF CARE
Goal Outcome Evaluation:              Outcome Evaluation: Pt ready for d/c home.

## 2024-06-07 LAB
CYTO UR: NORMAL
LAB AP CASE REPORT: NORMAL
Lab: NORMAL
PATH REPORT.FINAL DX SPEC: NORMAL
PATH REPORT.GROSS SPEC: NORMAL

## 2024-06-10 ENCOUNTER — MATERNAL SCREENING (OUTPATIENT)
Dept: CALL CENTER | Facility: HOSPITAL | Age: 34
End: 2024-06-10
Payer: COMMERCIAL

## 2024-06-10 NOTE — OUTREACH NOTE
Maternal Screening Survey      Flowsheet Row Responses   Facility patient discharged from? [facility]   Attempt successful? Yes   Call start time 1339   Call end time 1349   EPD Scale: Able to Laugh 0-->as much as she always could   EPD Scale: Looked Forward 0-->as much as she ever did   EPD Scale: Blamed Self 2-->yes, some of the time   EPD Scale: Been Anxious 0-->no, not at all   EPD Scale: Felt Panicky 0-->no, not at all   EPD Scale: Things Getting on Top 2-->yes, sometimes hasn't been coping as well as usual   EPD Scale: Difficulty Sleeping 0-->no, not at all   EPD Scale: Sad or Miserable 1-->not very often   EPD Scale: Crying 0-->no, never   EPD Scale: Thought of Harming Self 0-->never   [place]  Depression Score 5   Did any of your parents have problems with alcohol or drug use? No   Do any of your peers have problems with alcohol or drug use? No   Does your partner have problems with alcohol or drug use? No   Before you were pregnant did you have problems with alcohol or drug use? (past) No   In the past month, did you drink beer, wine, liquor or use any other drugs? (pregnancy) No   Maternal Screening call completed Yes   Call end time 1349              Hiwot PERALTA - Registered Nurse

## 2024-06-18 ENCOUNTER — POSTPARTUM VISIT (OUTPATIENT)
Age: 34
End: 2024-06-18
Payer: COMMERCIAL

## 2024-06-18 VITALS
HEIGHT: 65 IN | DIASTOLIC BLOOD PRESSURE: 70 MMHG | WEIGHT: 161 LBS | SYSTOLIC BLOOD PRESSURE: 118 MMHG | BODY MASS INDEX: 26.82 KG/M2 | RESPIRATION RATE: 18 BRPM

## 2024-06-18 DIAGNOSIS — K04.7 TOOTH ABSCESS: ICD-10-CM

## 2024-06-18 DIAGNOSIS — Z30.8 ENCOUNTER FOR TUBAL LIGATION COUNSELING: ICD-10-CM

## 2024-06-18 RX ORDER — ESCITALOPRAM OXALATE 10 MG/1
10 TABLET ORAL DAILY
Qty: 30 TABLET | Refills: 2 | Status: SHIPPED | OUTPATIENT
Start: 2024-06-18 | End: 2025-06-18

## 2024-06-18 RX ORDER — CLINDAMYCIN HYDROCHLORIDE 300 MG/1
300 CAPSULE ORAL 2 TIMES DAILY
Qty: 21 CAPSULE | Refills: 0 | Status: SHIPPED | OUTPATIENT
Start: 2024-06-18

## 2024-06-18 RX ORDER — ESCITALOPRAM OXALATE 10 MG/1
1 TABLET ORAL DAILY
COMMUNITY
Start: 2024-06-05

## 2024-06-18 RX ORDER — ARIPIPRAZOLE 15 MG/1
1 TABLET ORAL DAILY
COMMUNITY
Start: 2024-06-05

## 2024-06-19 RX ORDER — SODIUM CHLORIDE 9 MG/ML
40 INJECTION, SOLUTION INTRAVENOUS AS NEEDED
OUTPATIENT
Start: 2024-06-19

## 2024-06-19 RX ORDER — SODIUM CHLORIDE 0.9 % (FLUSH) 0.9 %
10 SYRINGE (ML) INJECTION EVERY 12 HOURS SCHEDULED
OUTPATIENT
Start: 2024-06-19

## 2024-06-19 RX ORDER — SODIUM CHLORIDE 0.9 % (FLUSH) 0.9 %
1-10 SYRINGE (ML) INJECTION AS NEEDED
OUTPATIENT
Start: 2024-06-19

## 2024-06-19 RX ORDER — SODIUM CHLORIDE 9 MG/ML
100 INJECTION, SOLUTION INTRAVENOUS CONTINUOUS
OUTPATIENT
Start: 2024-06-19

## 2024-06-19 NOTE — PROGRESS NOTES
"Chief Complaint  Postpartum Care (Patient is wanting to start taking her regular medication Lamictal, Abilify, and Lexapro. /Patient is having a possible abscess tooth. )    Subjective        Edilma Sacnhez presents to [facility] for postpartum visit. Pt s/p  without complication. Pt would like permanent sterilization with LSC bilateral salpingectomy.  Pt would like to restart her mood stabilizers however she is breastfeeding. Has been given script for lexapro by PCP.  Denies HI/SI.  Reports she feels decently stable at this time, however she would like to restart her lexapro. Is planning on breast feeding for some time.  Also with recent tooth abscess and cannot get dentist appt for several weeks. Would like a round of antibiotics.   History of Present Illness    Objective   Vital Signs:  /70   Resp 18   Ht 165.1 cm (65\")   Wt 73 kg (161 lb)   BMI 26.79 kg/m²   Estimated body mass index is 26.79 kg/m² as calculated from the following:    Height as of this encounter: 165.1 cm (65\").    Weight as of this encounter: 73 kg (161 lb).       BMI is >= 25 and <30. (Overweight) The following options were offered after discussion;: exercise counseling/recommendations and nutrition counseling/recommendations      Physical Exam   deferred  Result Review :                     Assessment and Plan     Diagnoses and all orders for this visit:    1. Postpartum care following vaginal delivery (Primary)  35 y/o CF s/p , doing well. Restart lexapro. If pt and her psychiatrist feel she needs her mood stabilizers, then I feel she should consider discontinuing breastfeeding. Pt feels she is stable enough to hold off for now but will restart lexapro. RTC 3 weeks for final pp visit and to schedule LSC bilateral salpingectomy. Signed FESTUS tubal papers today.   2. Tooth abscess  Left upper abscess. Clindamycin 300 mg po bid for 10 days. Pt to see dentist if needed.   3. Encounter for tubal ligation " counseling  -     CBC & Differential; Future  -     Basic Metabolic Panel; Future  -     sodium chloride 0.9 % flush 10 mL  -     sodium chloride 0.9 % flush 1-10 mL  -     sodium chloride 0.9 % infusion 40 mL  -     sodium chloride 0.9 % infusion  -     Case Request; Standing  -     Case Request    Other orders  -     escitalopram (Lexapro) 10 MG tablet; Take 1 tablet by mouth Daily.  Dispense: 30 tablet; Refill: 2  -     clindamycin (Cleocin) 300 MG capsule; Take 1 capsule by mouth 2 (Two) Times a Day.  Dispense: 21 capsule; Refill: 0  -     Follow Anesthesia Guidelines / Protocol; Future  -     Follow Anesthesia Guidelines / Protocol; Standing  -     Verify / Perform Chlorhexidine Skin Prep; Standing  -     Obtain Informed Consent; Future  -     Provide NPO Instructions to Patient; Future  -     Chlorhexidine Skin Prep; Future  -     Type & Screen; Standing  -     Insert Peripheral IV; Standing  -     [place] Lock & Maintain IV Access; Standing  -     Place Sequential Compression Device; Standing  -     Maintain Sequential Compression Device; Standing             Follow Up     Return in about 3 weeks (around 7/9/2024) for Pts next appt needs to be with Dr. Naqvi or Dr. Calabrese to schedule tubal ligation.  Patient was given instructions and counseling regarding her condition or for health maintenance advice. Please see specific information pulled into the AVS if appropriate.